# Patient Record
Sex: MALE | Race: BLACK OR AFRICAN AMERICAN | NOT HISPANIC OR LATINO | Employment: OTHER
[De-identification: names, ages, dates, MRNs, and addresses within clinical notes are randomized per-mention and may not be internally consistent; named-entity substitution may affect disease eponyms.]

---

## 2022-11-25 ENCOUNTER — HOSPITAL ENCOUNTER (OUTPATIENT)
Dept: DATA CONVERSION | Age: 82
End: 2022-11-25

## 2023-04-03 PROBLEM — R63.4 ABNORMAL WEIGHT LOSS: Status: ACTIVE | Noted: 2023-04-03

## 2023-04-03 PROBLEM — I25.2 PAST MYOCARDIAL INFARCTION: Status: ACTIVE | Noted: 2023-04-03

## 2023-04-03 PROBLEM — N40.0 BPH (BENIGN PROSTATIC HYPERPLASIA): Status: ACTIVE | Noted: 2023-04-03

## 2023-04-03 PROBLEM — J18.9 PNEUMONIA: Status: ACTIVE | Noted: 2023-04-03

## 2023-04-03 PROBLEM — E87.6 HYPOKALEMIA: Status: ACTIVE | Noted: 2023-04-03

## 2023-04-03 PROBLEM — H92.09 EAR PAIN: Status: ACTIVE | Noted: 2023-04-03

## 2023-04-03 PROBLEM — R39.15 URGENCY OF URINATION: Status: ACTIVE | Noted: 2023-04-03

## 2023-04-03 PROBLEM — R10.9 STOMACH DISCOMFORT: Status: ACTIVE | Noted: 2023-04-03

## 2023-04-03 PROBLEM — R60.9 EDEMA: Status: ACTIVE | Noted: 2023-04-03

## 2023-04-03 PROBLEM — H93.19 TINNITUS: Status: ACTIVE | Noted: 2023-04-03

## 2023-04-03 PROBLEM — K44.9 HIATAL HERNIA: Status: ACTIVE | Noted: 2023-04-03

## 2023-04-03 PROBLEM — I73.9 PERIPHERAL ARTERIAL DISEASE (CMS-HCC): Status: ACTIVE | Noted: 2023-04-03
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
## 2023-04-03 PROBLEM — R10.9 LEFT FLANK PAIN: Status: ACTIVE | Noted: 2023-04-03

## 2023-04-03 PROBLEM — I50.20: Status: ACTIVE | Noted: 2023-04-03

## 2023-04-03 PROBLEM — M48.061 LUMBAR SPINAL STENOSIS: Status: ACTIVE | Noted: 2023-04-03

## 2023-04-03 PROBLEM — R14.0 POSTPRANDIAL ABDOMINAL BLOATING: Status: ACTIVE | Noted: 2023-04-03

## 2023-04-03 PROBLEM — K21.00 REFLUX ESOPHAGITIS: Status: ACTIVE | Noted: 2023-04-03

## 2023-04-03 PROBLEM — M25.422 EFFUSION OF LEFT OLECRANON BURSA: Status: ACTIVE | Noted: 2023-04-03

## 2023-04-03 PROBLEM — H57.89 REDNESS OR DISCHARGE OF EYE: Status: ACTIVE | Noted: 2023-04-03

## 2023-04-03 PROBLEM — Z95.1 S/P CABG (CORONARY ARTERY BYPASS GRAFT): Status: ACTIVE | Noted: 2023-04-03

## 2023-04-03 PROBLEM — R97.20 ABNORMAL PSA: Status: ACTIVE | Noted: 2023-04-03
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
## 2023-04-03 PROBLEM — D64.9 ANEMIA: Status: ACTIVE | Noted: 2023-04-03

## 2023-04-03 PROBLEM — I10 HYPERTENSION: Status: ACTIVE | Noted: 2023-04-03

## 2023-04-03 PROBLEM — R05.9 COUGH: Status: ACTIVE | Noted: 2023-04-03

## 2023-04-03 PROBLEM — R10.816 ABDOMINAL TENDERNESS, EPIGASTRIC: Status: ACTIVE | Noted: 2023-04-03

## 2023-04-03 PROBLEM — E78.5 HLD (HYPERLIPIDEMIA): Status: ACTIVE | Noted: 2023-04-03

## 2023-04-03 PROBLEM — M54.30 SCIATIC NERVE PAIN: Status: ACTIVE | Noted: 2023-04-03

## 2023-04-03 PROBLEM — R35.0 FREQUENT URINATION: Status: ACTIVE | Noted: 2023-04-03

## 2023-04-03 PROBLEM — A04.8 HELICOBACTER PYLORI (H. PYLORI) INFECTION: Status: ACTIVE | Noted: 2023-04-03

## 2023-04-03 PROBLEM — M10.9 GOUT: Status: ACTIVE | Noted: 2023-04-03

## 2023-04-03 PROBLEM — R35.1 NOCTURIA: Status: ACTIVE | Noted: 2023-04-03

## 2023-04-03 PROBLEM — R04.2 HEMOPTYSIS: Status: ACTIVE | Noted: 2023-04-03

## 2023-04-03 PROBLEM — I73.9 INTERMITTENT CLAUDICATION (CMS-HCC): Status: ACTIVE | Noted: 2023-04-03

## 2023-04-03 PROBLEM — E11.40 NEUROPATHY IN DIABETES (MULTI): Status: ACTIVE | Noted: 2023-04-03

## 2023-04-03 PROBLEM — Z86.718 HISTORY OF DVT (DEEP VEIN THROMBOSIS): Status: ACTIVE | Noted: 2023-04-03

## 2023-04-03 PROBLEM — R10.9 RIGHT FLANK PAIN: Status: ACTIVE | Noted: 2023-04-03

## 2023-04-03 PROBLEM — N63.10 BREAST MASS, RIGHT: Status: ACTIVE | Noted: 2023-04-03

## 2023-04-03 PROBLEM — R06.09 DOE (DYSPNEA ON EXERTION): Status: ACTIVE | Noted: 2023-04-03

## 2023-04-03 PROBLEM — E11.9 DIABETES MELLITUS (MULTI): Status: ACTIVE | Noted: 2023-04-03

## 2023-04-03 PROBLEM — I50.20 HEART FAILURE WITH REDUCED EJECTION FRACTION (MULTI): Status: ACTIVE | Noted: 2023-04-03

## 2023-04-03 PROBLEM — R93.5 ABNORMAL CT OF THE ABDOMEN: Status: ACTIVE | Noted: 2023-04-03

## 2023-04-03 PROBLEM — G45.9 TIA (TRANSIENT ISCHEMIC ATTACK): Status: ACTIVE | Noted: 2023-04-03

## 2023-04-03 PROBLEM — I25.10 CAD (CORONARY ARTERY DISEASE): Status: ACTIVE | Noted: 2023-04-03

## 2023-04-03 PROBLEM — N31.9 NEUROGENIC BLADDER: Status: ACTIVE | Noted: 2023-04-03

## 2023-04-03 PROBLEM — I25.5 ISCHEMIC CARDIOMYOPATHY: Status: ACTIVE | Noted: 2023-04-03

## 2023-04-03 PROBLEM — H91.90 HEARING LOSS: Status: ACTIVE | Noted: 2023-04-03

## 2023-04-03 PROBLEM — Z95.810 ICD (IMPLANTABLE CARDIOVERTER-DEFIBRILLATOR) IN PLACE: Status: ACTIVE | Noted: 2023-04-03

## 2023-04-03 PROBLEM — R07.9 CHEST PAIN: Status: ACTIVE | Noted: 2023-04-03

## 2023-04-03 PROBLEM — M70.22 OLECRANON BURSITIS OF LEFT ELBOW: Status: ACTIVE | Noted: 2023-04-03

## 2023-04-03 PROBLEM — N28.9 RENAL INSUFFICIENCY: Status: ACTIVE | Noted: 2023-04-03

## 2023-04-03 PROBLEM — I34.0 MITRAL REGURGITATION: Status: ACTIVE | Noted: 2023-04-03

## 2023-04-03 PROBLEM — E03.9 HYPOTHYROIDISM: Status: ACTIVE | Noted: 2023-04-03

## 2023-04-03 PROBLEM — R11.2 NAUSEA AND VOMITING: Status: ACTIVE | Noted: 2023-04-03

## 2023-04-03 PROBLEM — R32 INCONTINENCE: Status: ACTIVE | Noted: 2023-04-03

## 2023-04-03 RX ORDER — FUROSEMIDE 40 MG/1
80 TABLET ORAL EVERY MORNING
COMMUNITY
Start: 2017-02-08 | End: 2023-11-16

## 2023-04-03 RX ORDER — ASPIRIN 81 MG/1
1 TABLET ORAL DAILY
COMMUNITY

## 2023-04-03 RX ORDER — OXYBUTYNIN CHLORIDE 5 MG/1
1 TABLET ORAL 2 TIMES DAILY
COMMUNITY
Start: 2022-07-22 | End: 2024-03-05 | Stop reason: ENTERED-IN-ERROR

## 2023-04-03 RX ORDER — ISOSORBIDE DINITRATE 40 MG/1
1 TABLET ORAL EVERY 8 HOURS
COMMUNITY
Start: 2018-05-10 | End: 2024-03-05 | Stop reason: ENTERED-IN-ERROR

## 2023-04-03 RX ORDER — TAMSULOSIN HYDROCHLORIDE 0.4 MG/1
0.4 CAPSULE ORAL DAILY
COMMUNITY
End: 2023-10-02 | Stop reason: SDUPTHER

## 2023-04-03 RX ORDER — CARVEDILOL 25 MG/1
2 TABLET ORAL 2 TIMES DAILY
COMMUNITY
Start: 2018-05-10 | End: 2024-06-03 | Stop reason: SDUPTHER

## 2023-04-03 RX ORDER — ATORVASTATIN CALCIUM 40 MG/1
1 TABLET, FILM COATED ORAL NIGHTLY
COMMUNITY
Start: 2018-10-23 | End: 2023-04-04 | Stop reason: SDUPTHER

## 2023-04-03 RX ORDER — HYDRALAZINE HYDROCHLORIDE 100 MG/1
1 TABLET, FILM COATED ORAL 3 TIMES DAILY
COMMUNITY
Start: 2018-04-21 | End: 2024-04-19 | Stop reason: SDUPTHER

## 2023-04-03 RX ORDER — RIVAROXABAN 10 MG/1
10 TABLET, FILM COATED ORAL
COMMUNITY
Start: 2022-04-08 | End: 2024-03-05 | Stop reason: ENTERED-IN-ERROR

## 2023-04-04 DIAGNOSIS — E78.5 HYPERLIPIDEMIA, UNSPECIFIED HYPERLIPIDEMIA TYPE: Primary | ICD-10-CM

## 2023-04-04 RX ORDER — ATORVASTATIN CALCIUM 40 MG/1
40 TABLET, FILM COATED ORAL NIGHTLY
Qty: 30 TABLET | Refills: 3 | Status: SHIPPED | OUTPATIENT
Start: 2023-04-04 | End: 2023-04-10 | Stop reason: SDUPTHER

## 2023-04-10 DIAGNOSIS — E78.5 HYPERLIPIDEMIA, UNSPECIFIED HYPERLIPIDEMIA TYPE: ICD-10-CM

## 2023-04-10 RX ORDER — ATORVASTATIN CALCIUM 40 MG/1
40 TABLET, FILM COATED ORAL NIGHTLY
Qty: 90 TABLET | Refills: 3 | Status: SHIPPED | OUTPATIENT
Start: 2023-04-10 | End: 2023-05-25 | Stop reason: SDUPTHER

## 2023-05-23 ENCOUNTER — TELEPHONE (OUTPATIENT)
Dept: PRIMARY CARE | Facility: CLINIC | Age: 83
End: 2023-05-23
Payer: COMMERCIAL

## 2023-05-25 DIAGNOSIS — E78.5 HYPERLIPIDEMIA, UNSPECIFIED HYPERLIPIDEMIA TYPE: ICD-10-CM

## 2023-05-25 RX ORDER — ATORVASTATIN CALCIUM 40 MG/1
40 TABLET, FILM COATED ORAL NIGHTLY
Qty: 90 TABLET | Refills: 3 | Status: SHIPPED | OUTPATIENT
Start: 2023-05-25 | End: 2024-05-31

## 2023-06-20 ENCOUNTER — OFFICE VISIT (OUTPATIENT)
Dept: PRIMARY CARE | Facility: CLINIC | Age: 83
End: 2023-06-20
Payer: COMMERCIAL

## 2023-06-20 VITALS — WEIGHT: 145.5 LBS | BODY MASS INDEX: 20.88 KG/M2

## 2023-06-20 DIAGNOSIS — H91.91 HEARING DEFICIT, RIGHT: ICD-10-CM

## 2023-06-20 DIAGNOSIS — I10 HYPERTENSION, UNSPECIFIED TYPE: Primary | ICD-10-CM

## 2023-06-20 DIAGNOSIS — F32.9 REACTIVE DEPRESSION: ICD-10-CM

## 2023-06-20 DIAGNOSIS — R53.83 FATIGUE, UNSPECIFIED TYPE: ICD-10-CM

## 2023-06-20 DIAGNOSIS — N39.41 URGE INCONTINENCE OF URINE: ICD-10-CM

## 2023-06-20 DIAGNOSIS — E78.5 HYPERLIPIDEMIA, UNSPECIFIED HYPERLIPIDEMIA TYPE: ICD-10-CM

## 2023-06-20 PROCEDURE — 85025 COMPLETE CBC W/AUTO DIFF WBC: CPT | Performed by: INTERNAL MEDICINE

## 2023-06-20 PROCEDURE — 80061 LIPID PANEL: CPT | Performed by: INTERNAL MEDICINE

## 2023-06-20 PROCEDURE — 80053 COMPREHEN METABOLIC PANEL: CPT | Performed by: INTERNAL MEDICINE

## 2023-06-20 PROCEDURE — 1159F MED LIST DOCD IN RCRD: CPT | Performed by: INTERNAL MEDICINE

## 2023-06-20 PROCEDURE — 84443 ASSAY THYROID STIM HORMONE: CPT | Performed by: INTERNAL MEDICINE

## 2023-06-20 PROCEDURE — 99214 OFFICE O/P EST MOD 30 MIN: CPT | Performed by: INTERNAL MEDICINE

## 2023-06-20 PROCEDURE — 1160F RVW MEDS BY RX/DR IN RCRD: CPT | Performed by: INTERNAL MEDICINE

## 2023-06-20 ASSESSMENT — ENCOUNTER SYMPTOMS
OCCASIONAL FEELINGS OF UNSTEADINESS: 0
LOSS OF SENSATION IN FEET: 1
DEPRESSION: 1

## 2023-06-20 NOTE — PROGRESS NOTES
Subjective   Patient ID: Chetan Luna is a 83 y.o. male who presents for Hearing Problem and Urinary Frequency.    HPI   83 years old male comes in to see me today complaining of urgency with frequent urination, depression concerning the loss of his wife and son, loss of hearing and his right ear.  He is experiencing minimal weight loss almost 5 to 6 pounds since earlier this year.  His appetite has diminished and he is not eating as much anymore.  He is staying with his daughter got 2 daughters in town.  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  He is asking for help.  Review of Systems  12 system reviewed all negative except for little mild weight loss and depression plus losing hearing in his right ear.  Objective   Wt 66 kg (145 lb 8 oz)   BMI 20.88 kg/m²     Physical Exam  Alert oriented pleasant as usual but depressed having tears at times.  Nonicteric sclera or jaundice.  Face symmetrical cranial nerves intact.  Neck supple no masses no lymph no thyromegaly or jugular venous distention.  Lungs clear diminished no rales or wheezing.  Heart normal S1 and S2 regular rhythm.  Abdomen benign neurological exam intact.  We agreed to consult ENT for his right ear, agreed to consult urology for his urge incontinence, also agreed to consult psychiatry for his depression.  His medications or remained the same.  No need for any refills come back when you are done with your this consultation and see what they tell you.  He said.  Assessment/Plan   Diagnoses and all orders for this visit:  Hypertension, unspecified type  -     Comprehensive Metabolic Panel  -     CBC  Hyperlipidemia, unspecified hyperlipidemia type  -     Lipid Panel  Fatigue, unspecified type  -     Thyroid Stimulating Hormone  Urge incontinence of urine  -     Referral to Urology; Future  Hearing deficit, right  -     Referral to ENT; Future  Reactive depression  -     Referral to  Psychiatry; Future

## 2023-07-08 ENCOUNTER — DOCUMENTATION (OUTPATIENT)
Dept: PRIMARY CARE | Facility: CLINIC | Age: 83
End: 2023-07-08
Payer: COMMERCIAL

## 2023-07-08 NOTE — PROGRESS NOTES
I called the patient and discussed his lab results with him please see neurology consultation by the end of this month and early August.  For urology and check his depression and his hearing loss etc. losing weight but he is trying to eat well and drink a lot of liquid.  He is feeling well he said.  Was very thankful for this call.

## 2023-10-02 ENCOUNTER — TELEPHONE (OUTPATIENT)
Dept: PRIMARY CARE | Facility: CLINIC | Age: 83
End: 2023-10-02
Payer: COMMERCIAL

## 2023-10-02 DIAGNOSIS — N40.1 BENIGN PROSTATIC HYPERPLASIA WITH LOWER URINARY TRACT SYMPTOMS, SYMPTOM DETAILS UNSPECIFIED: ICD-10-CM

## 2023-10-02 RX ORDER — TAMSULOSIN HYDROCHLORIDE 0.4 MG/1
0.4 CAPSULE ORAL DAILY
Qty: 0.9 CAPSULE | Refills: 3 | Status: SHIPPED | OUTPATIENT
Start: 2023-10-02 | End: 2024-10-01

## 2023-10-19 ENCOUNTER — OFFICE VISIT (OUTPATIENT)
Dept: PRIMARY CARE | Facility: CLINIC | Age: 83
End: 2023-10-19
Payer: COMMERCIAL

## 2023-11-08 ENCOUNTER — PHARMACY VISIT (OUTPATIENT)
Dept: PHARMACY | Facility: CLINIC | Age: 83
End: 2023-11-08
Payer: MEDICARE

## 2023-11-08 DIAGNOSIS — Z86.718 HISTORY OF DVT (DEEP VEIN THROMBOSIS): Primary | ICD-10-CM

## 2023-11-08 PROCEDURE — RXMED WILLOW AMBULATORY MEDICATION CHARGE

## 2023-11-16 DIAGNOSIS — I25.110 CORONARY ARTERY DISEASE INVOLVING NATIVE CORONARY ARTERY OF NATIVE HEART WITH UNSTABLE ANGINA PECTORIS (MULTI): Primary | ICD-10-CM

## 2023-11-16 RX ORDER — ISOSORBIDE DINITRATE 20 MG/1
20 TABLET ORAL EVERY 8 HOURS
Qty: 270 TABLET | Refills: 3 | Status: SHIPPED | OUTPATIENT
Start: 2023-11-16

## 2024-01-05 ENCOUNTER — HOSPITAL ENCOUNTER (EMERGENCY)
Facility: HOSPITAL | Age: 84
Discharge: HOME | End: 2024-01-05
Payer: COMMERCIAL

## 2024-01-05 ENCOUNTER — APPOINTMENT (OUTPATIENT)
Dept: RADIOLOGY | Facility: HOSPITAL | Age: 84
End: 2024-01-05
Payer: COMMERCIAL

## 2024-01-05 VITALS
WEIGHT: 160 LBS | HEART RATE: 86 BPM | RESPIRATION RATE: 18 BRPM | DIASTOLIC BLOOD PRESSURE: 68 MMHG | SYSTOLIC BLOOD PRESSURE: 120 MMHG | OXYGEN SATURATION: 99 % | TEMPERATURE: 98.8 F | HEIGHT: 70 IN | BODY MASS INDEX: 22.9 KG/M2

## 2024-01-05 DIAGNOSIS — T56.0X1A ACUTE LEAD-INDUCED GOUT INVOLVING TOE OF LEFT FOOT, INITIAL ENCOUNTER: Primary | ICD-10-CM

## 2024-01-05 DIAGNOSIS — M10.172 ACUTE LEAD-INDUCED GOUT INVOLVING TOE OF LEFT FOOT, INITIAL ENCOUNTER: Primary | ICD-10-CM

## 2024-01-05 PROCEDURE — 99284 EMERGENCY DEPT VISIT MOD MDM: CPT

## 2024-01-05 PROCEDURE — 73610 X-RAY EXAM OF ANKLE: CPT | Mod: LEFT SIDE | Performed by: RADIOLOGY

## 2024-01-05 PROCEDURE — 73630 X-RAY EXAM OF FOOT: CPT | Mod: LEFT SIDE | Performed by: RADIOLOGY

## 2024-01-05 PROCEDURE — 2500000004 HC RX 250 GENERAL PHARMACY W/ HCPCS (ALT 636 FOR OP/ED)

## 2024-01-05 PROCEDURE — 73630 X-RAY EXAM OF FOOT: CPT | Mod: LT

## 2024-01-05 PROCEDURE — 73610 X-RAY EXAM OF ANKLE: CPT | Mod: LT,FR

## 2024-01-05 RX ORDER — ACETAMINOPHEN 325 MG/1
TABLET ORAL
Status: COMPLETED
Start: 2024-01-05 | End: 2024-01-05

## 2024-01-05 RX ORDER — ACETAMINOPHEN 325 MG/1
975 TABLET ORAL ONCE
Status: COMPLETED | OUTPATIENT
Start: 2024-01-05 | End: 2024-01-05

## 2024-01-05 RX ORDER — PREDNISONE 20 MG/1
TABLET ORAL
Status: COMPLETED
Start: 2024-01-05 | End: 2024-01-05

## 2024-01-05 RX ORDER — PREDNISONE 20 MG/1
40 TABLET ORAL DAILY
Qty: 8 TABLET | Refills: 0 | Status: SHIPPED | OUTPATIENT
Start: 2024-01-05 | End: 2024-01-09

## 2024-01-05 RX ORDER — PREDNISONE 20 MG/1
60 TABLET ORAL ONCE
Status: COMPLETED | OUTPATIENT
Start: 2024-01-05 | End: 2024-01-05

## 2024-01-05 RX ORDER — ACETAMINOPHEN 500 MG
1000 TABLET ORAL EVERY 6 HOURS PRN
Qty: 30 TABLET | Refills: 0 | Status: SHIPPED | OUTPATIENT
Start: 2024-01-05 | End: 2024-01-15

## 2024-01-05 RX ADMIN — ACETAMINOPHEN 975 MG: 325 TABLET ORAL at 14:04

## 2024-01-05 RX ADMIN — PREDNISONE 60 MG: 20 TABLET ORAL at 14:05

## 2024-01-05 ASSESSMENT — COLUMBIA-SUICIDE SEVERITY RATING SCALE - C-SSRS
2. HAVE YOU ACTUALLY HAD ANY THOUGHTS OF KILLING YOURSELF?: NO
1. IN THE PAST MONTH, HAVE YOU WISHED YOU WERE DEAD OR WISHED YOU COULD GO TO SLEEP AND NOT WAKE UP?: NO
6. HAVE YOU EVER DONE ANYTHING, STARTED TO DO ANYTHING, OR PREPARED TO DO ANYTHING TO END YOUR LIFE?: NO

## 2024-01-05 ASSESSMENT — PAIN SCALES - GENERAL: PAINLEVEL_OUTOF10: 9

## 2024-01-05 ASSESSMENT — PAIN DESCRIPTION - PAIN TYPE: TYPE: ACUTE PAIN

## 2024-01-05 ASSESSMENT — PAIN - FUNCTIONAL ASSESSMENT: PAIN_FUNCTIONAL_ASSESSMENT: 0-10

## 2024-01-05 NOTE — ED PROVIDER NOTES
HPI   Chief Complaint   Patient presents with    Leg Swelling     Left foot       This an 83-year-old male coming in for left ankle and foot pain.  Patient reports that the pain has been present for the last few days.  He denies any injury.  He has been told that he has a history of gout.  Patient states that the pain increases when the area is palpated.  He also reports increased pain on ambulation.  No other areas of pain or discomfort.                          No data recorded                Patient History   Past Medical History:   Diagnosis Date    Encounter for screening for malignant neoplasm of prostate     Screening PSA (prostate specific antigen)    Old myocardial infarction 09/14/2017    History of myocardial infarction    Personal history of other diseases of the musculoskeletal system and connective tissue     Personal history of arthritis     Past Surgical History:   Procedure Laterality Date    CARDIAC PACEMAKER PLACEMENT  02/14/2014    Pacemaker Placement    CHOLECYSTECTOMY  10/19/2016    Cholecystectomy    CORONARY ARTERY BYPASS GRAFT  10/19/2016    CABG    CT ANGIO NECK  10/22/2018    CT NECK ANGIO W AND WO IV CONTRAST 10/22/2018 [facility] CLINICAL LEGACY    CT HEAD ANGIO W AND WO IV CONTRAST  10/22/2018    CT HEAD ANGIO W AND WO IV CONTRAST 10/22/2018 [facility] CLINICAL LEGACY    OTHER SURGICAL HISTORY  02/07/2014    Coronary Artery Double Venous Bypass Graft    OTHER SURGICAL HISTORY  02/07/2014    Cholecystotomy     Family History   Problem Relation Name Age of Onset    Heart failure Mother      Prostate cancer Father       Social History     Tobacco Use    Smoking status: Some Days     Types: Cigars    Smokeless tobacco: Never   Substance Use Topics    Alcohol use: Not on file    Drug use: Not on file       Physical Exam   ED Triage Vitals [01/05/24 1330]   Temp Heart Rate Resp BP   37.1 °C (98.8 °F) 86 18 120/68      SpO2 Temp Source Heart Rate Source Patient Position   99 % Temporal Monitor --      BP  Location FiO2 (%)     Left arm --       Physical Exam  Vitals and nursing note reviewed.   Constitutional:       General: He is not in acute distress.     Appearance: Normal appearance. He is not ill-appearing or toxic-appearing.   HENT:      Head: Normocephalic and atraumatic.   Eyes:      Extraocular Movements: Extraocular movements intact.      Conjunctiva/sclera: Conjunctivae normal.      Pupils: Pupils are equal, round, and reactive to light.   Cardiovascular:      Rate and Rhythm: Regular rhythm.      Pulses: Normal pulses.      Heart sounds: Normal heart sounds.   Pulmonary:      Effort: Pulmonary effort is normal. No respiratory distress.      Breath sounds: Normal breath sounds.   Abdominal:      General: Abdomen is flat. There is no distension.   Musculoskeletal:         General: Normal range of motion.      Cervical back: Normal range of motion and neck supple.   Feet:      Comments: Patient reports pain on palpation to the first MTP joint.  He also reports pain in the ankle however no pain to palpation to this area.  Skin:     General: Skin is warm and dry.   Neurological:      General: No focal deficit present.      Mental Status: He is alert and oriented to person, place, and time.   Psychiatric:         Mood and Affect: Mood normal.         Behavior: Behavior normal.         Thought Content: Thought content normal.         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 Patient case including ROS, PE, and treatment and plan discussed with ED attending if attached as cosigner. Results from labs and or imaging included below if completed. Chetan Luna  is a 83 y.o. coming in for Patient presents with:  Leg Swelling: Left foot  .  Due to patient's complaint imaging was completed of the areas of discomfort.  Imaging shows no  acute concerning abnormalities.  He is made aware of the vascular calcifications.  He is given prednisone and Tylenol.  He states that this pain has been improving.  Patient will be discharged with Tylenol and prednisone.  He is a diabetic and advised increased blood sugar checks.  Follow-up with his PCP for further evaluation and care.    Patient was advised to follow up with PCP or recommended provider in 2-3 days for another evaluation and exam. I advised patient/guardian to return or go to closest emergency room immediately if symptoms change, get worse, new symptoms develop prior to follow up. If there is no improvement in symptoms in the next 24 hours they are advised to return for further evaluation and exam. I also explained the plan and treatment course. Patient/guardian is in agreement with plan, treatment course, and follow up and states verbally that they will comply.    Labs Reviewed - No data to display   XR foot left 3+ views   Final Result    Left Foot:  Demineralization.  Bony structures appear intact.     Atherosclerotic vascular calcifications.    Left Ankle:  Demineralization.  Bony structures appear intact.     Atherosclerotic vascular calcifications.    Signed by Kody Jolly MD     XR ankle left 3+ views   Final Result    Left Foot:  Demineralization.  Bony structures appear intact.     Atherosclerotic vascular calcifications.    Left Ankle:  Demineralization.  Bony structures appear intact.     Atherosclerotic vascular calcifications.    Signed by Kody Jolly MD          Tests/Medications/Escalations of Care considered but not given: As in MDM    Patient care discussed with: N/A  Social Determinants affecting care: N/A    Final diagnosis and disposition as documented       Homegoing. I discussed the differential; results and discharge plan with the patient and/or family/friend/caregiver if present.  I emphasized the importance of follow-up with the physician I referred them to in the  timeframe recommended.  I explained reasons for the patient to return to the Emergency Department. They agreed that if they feel their condition is worsening or if they have any other concern they should call 911 immediately for further assistance. I gave the patient an opportunity to ask all questions they had and answered all of them accordingly. They understand return precautions and discharge instructions. The patient and/or family/friend/caregiver expressed understanding verbally and that they would comply.     Disposition: Discharge      This note has been transcribed using voice recognition and may contain grammatical errors, misplaced words, incorrect words, incorrect phrases or other errors.         Procedure  Procedures     Angel Denney PA-C  01/05/24 1553

## 2024-01-15 DIAGNOSIS — Z95.810 CARDIAC DEFIBRILLATOR IN PLACE: Primary | ICD-10-CM

## 2024-01-15 DIAGNOSIS — I42.9 CARDIOMYOPATHY, UNSPECIFIED TYPE (MULTI): ICD-10-CM

## 2024-02-07 ENCOUNTER — APPOINTMENT (OUTPATIENT)
Dept: CARDIOLOGY | Facility: CLINIC | Age: 84
End: 2024-02-07
Payer: COMMERCIAL

## 2024-02-14 DIAGNOSIS — I25.5 ISCHEMIC CARDIOMYOPATHY: Primary | ICD-10-CM

## 2024-03-05 ENCOUNTER — APPOINTMENT (OUTPATIENT)
Dept: CARDIOLOGY | Facility: HOSPITAL | Age: 84
End: 2024-03-05
Payer: COMMERCIAL

## 2024-03-05 ENCOUNTER — APPOINTMENT (OUTPATIENT)
Dept: RADIOLOGY | Facility: HOSPITAL | Age: 84
End: 2024-03-05
Payer: COMMERCIAL

## 2024-03-05 ENCOUNTER — HOSPITAL ENCOUNTER (EMERGENCY)
Facility: HOSPITAL | Age: 84
Discharge: HOME | End: 2024-03-05
Attending: EMERGENCY MEDICINE
Payer: COMMERCIAL

## 2024-03-05 VITALS
WEIGHT: 152 LBS | DIASTOLIC BLOOD PRESSURE: 90 MMHG | HEIGHT: 70 IN | RESPIRATION RATE: 19 BRPM | BODY MASS INDEX: 21.76 KG/M2 | OXYGEN SATURATION: 97 % | SYSTOLIC BLOOD PRESSURE: 153 MMHG | HEART RATE: 71 BPM | TEMPERATURE: 98.4 F

## 2024-03-05 DIAGNOSIS — M25.511 ACUTE PAIN OF RIGHT SHOULDER: Primary | ICD-10-CM

## 2024-03-05 LAB
ALBUMIN SERPL BCP-MCNC: 4.2 G/DL (ref 3.4–5)
ALP SERPL-CCNC: 84 U/L (ref 33–136)
ALT SERPL W P-5'-P-CCNC: 8 U/L (ref 10–52)
ANION GAP SERPL CALC-SCNC: 14 MMOL/L (ref 10–20)
AST SERPL W P-5'-P-CCNC: 13 U/L (ref 9–39)
ATRIAL RATE: 76 BPM
BASOPHILS # BLD AUTO: 0.02 X10*3/UL (ref 0–0.1)
BASOPHILS NFR BLD AUTO: 0.3 %
BILIRUB SERPL-MCNC: 0.5 MG/DL (ref 0–1.2)
BUN SERPL-MCNC: 20 MG/DL (ref 6–23)
CALCIUM SERPL-MCNC: 8.8 MG/DL (ref 8.6–10.3)
CARDIAC TROPONIN I PNL SERPL HS: 25 NG/L (ref 0–20)
CARDIAC TROPONIN I PNL SERPL HS: 26 NG/L (ref 0–20)
CHLORIDE SERPL-SCNC: 105 MMOL/L (ref 98–107)
CO2 SERPL-SCNC: 21 MMOL/L (ref 21–32)
CREAT SERPL-MCNC: 1.63 MG/DL (ref 0.5–1.3)
CRP SERPL-MCNC: 1.06 MG/DL
EGFRCR SERPLBLD CKD-EPI 2021: 42 ML/MIN/1.73M*2
EOSINOPHIL # BLD AUTO: 0.06 X10*3/UL (ref 0–0.4)
EOSINOPHIL NFR BLD AUTO: 0.8 %
ERYTHROCYTE [DISTWIDTH] IN BLOOD BY AUTOMATED COUNT: 16.7 % (ref 11.5–14.5)
ERYTHROCYTE [SEDIMENTATION RATE] IN BLOOD BY WESTERGREN METHOD: 51 MM/H (ref 0–20)
GLUCOSE SERPL-MCNC: 147 MG/DL (ref 74–99)
HCT VFR BLD AUTO: 35.1 % (ref 41–52)
HGB BLD-MCNC: 11.8 G/DL (ref 13.5–17.5)
IMM GRANULOCYTES # BLD AUTO: 0.03 X10*3/UL (ref 0–0.5)
IMM GRANULOCYTES NFR BLD AUTO: 0.4 % (ref 0–0.9)
LYMPHOCYTES # BLD AUTO: 0.82 X10*3/UL (ref 0.8–3)
LYMPHOCYTES NFR BLD AUTO: 11.2 %
MCH RBC QN AUTO: 26.8 PG (ref 26–34)
MCHC RBC AUTO-ENTMCNC: 33.6 G/DL (ref 32–36)
MCV RBC AUTO: 80 FL (ref 80–100)
MONOCYTES # BLD AUTO: 0.83 X10*3/UL (ref 0.05–0.8)
MONOCYTES NFR BLD AUTO: 11.3 %
NEUTROPHILS # BLD AUTO: 5.59 X10*3/UL (ref 1.6–5.5)
NEUTROPHILS NFR BLD AUTO: 76 %
NRBC BLD-RTO: 0 /100 WBCS (ref 0–0)
P AXIS: 111 DEGREES
P OFFSET: 176 MS
P ONSET: 147 MS
PLATELET # BLD AUTO: 208 X10*3/UL (ref 150–450)
POTASSIUM SERPL-SCNC: 3.9 MMOL/L (ref 3.5–5.3)
PR INTERVAL: 170 MS
PROT SERPL-MCNC: 7.2 G/DL (ref 6.4–8.2)
Q ONSET: 196 MS
QRS COUNT: 13 BEATS
QRS DURATION: 174 MS
QT INTERVAL: 454 MS
QTC CALCULATION(BAZETT): 510 MS
QTC FREDERICIA: 491 MS
R AXIS: 199 DEGREES
RBC # BLD AUTO: 4.41 X10*6/UL (ref 4.5–5.9)
SODIUM SERPL-SCNC: 136 MMOL/L (ref 136–145)
T AXIS: 23 DEGREES
T OFFSET: 423 MS
VENTRICULAR RATE: 76 BPM
WBC # BLD AUTO: 7.4 X10*3/UL (ref 4.4–11.3)

## 2024-03-05 PROCEDURE — 86140 C-REACTIVE PROTEIN: CPT | Performed by: EMERGENCY MEDICINE

## 2024-03-05 PROCEDURE — 2500000001 HC RX 250 WO HCPCS SELF ADMINISTERED DRUGS (ALT 637 FOR MEDICARE OP): Performed by: EMERGENCY MEDICINE

## 2024-03-05 PROCEDURE — 93005 ELECTROCARDIOGRAM TRACING: CPT

## 2024-03-05 PROCEDURE — 84484 ASSAY OF TROPONIN QUANT: CPT | Performed by: EMERGENCY MEDICINE

## 2024-03-05 PROCEDURE — 85025 COMPLETE CBC W/AUTO DIFF WBC: CPT | Performed by: PHYSICIAN ASSISTANT

## 2024-03-05 PROCEDURE — 84484 ASSAY OF TROPONIN QUANT: CPT | Performed by: PHYSICIAN ASSISTANT

## 2024-03-05 PROCEDURE — 73200 CT UPPER EXTREMITY W/O DYE: CPT | Mod: RIGHT SIDE | Performed by: RADIOLOGY

## 2024-03-05 PROCEDURE — 96374 THER/PROPH/DIAG INJ IV PUSH: CPT

## 2024-03-05 PROCEDURE — 36415 COLL VENOUS BLD VENIPUNCTURE: CPT | Performed by: EMERGENCY MEDICINE

## 2024-03-05 PROCEDURE — 96375 TX/PRO/DX INJ NEW DRUG ADDON: CPT

## 2024-03-05 PROCEDURE — 80053 COMPREHEN METABOLIC PANEL: CPT | Performed by: PHYSICIAN ASSISTANT

## 2024-03-05 PROCEDURE — 99285 EMERGENCY DEPT VISIT HI MDM: CPT | Mod: 25

## 2024-03-05 PROCEDURE — 36415 COLL VENOUS BLD VENIPUNCTURE: CPT | Performed by: PHYSICIAN ASSISTANT

## 2024-03-05 PROCEDURE — 73030 X-RAY EXAM OF SHOULDER: CPT | Mod: RIGHT SIDE | Performed by: RADIOLOGY

## 2024-03-05 PROCEDURE — 2500000004 HC RX 250 GENERAL PHARMACY W/ HCPCS (ALT 636 FOR OP/ED): Performed by: PHYSICIAN ASSISTANT

## 2024-03-05 PROCEDURE — 73030 X-RAY EXAM OF SHOULDER: CPT | Mod: RT

## 2024-03-05 PROCEDURE — 73200 CT UPPER EXTREMITY W/O DYE: CPT | Mod: RT

## 2024-03-05 PROCEDURE — 2500000004 HC RX 250 GENERAL PHARMACY W/ HCPCS (ALT 636 FOR OP/ED): Performed by: EMERGENCY MEDICINE

## 2024-03-05 PROCEDURE — 85652 RBC SED RATE AUTOMATED: CPT | Performed by: PHYSICIAN ASSISTANT

## 2024-03-05 RX ORDER — KETOROLAC TROMETHAMINE 30 MG/ML
15 INJECTION, SOLUTION INTRAMUSCULAR; INTRAVENOUS ONCE
Status: COMPLETED | OUTPATIENT
Start: 2024-03-05 | End: 2024-03-05

## 2024-03-05 RX ORDER — ACETAMINOPHEN 500 MG
1000 TABLET ORAL EVERY 8 HOURS PRN
Qty: 42 TABLET | Refills: 0 | Status: SHIPPED | OUTPATIENT
Start: 2024-03-05 | End: 2024-03-12

## 2024-03-05 RX ORDER — OXYCODONE HYDROCHLORIDE 5 MG/1
5 TABLET ORAL ONCE
Status: COMPLETED | OUTPATIENT
Start: 2024-03-05 | End: 2024-03-05

## 2024-03-05 RX ORDER — MORPHINE SULFATE 4 MG/ML
4 INJECTION, SOLUTION INTRAMUSCULAR; INTRAVENOUS ONCE
Status: COMPLETED | OUTPATIENT
Start: 2024-03-05 | End: 2024-03-05

## 2024-03-05 RX ORDER — OXYCODONE HYDROCHLORIDE 5 MG/1
5 TABLET ORAL EVERY 8 HOURS PRN
Qty: 9 TABLET | Refills: 0 | Status: SHIPPED | OUTPATIENT
Start: 2024-03-05 | End: 2024-03-08

## 2024-03-05 RX ADMIN — MORPHINE SULFATE 4 MG: 4 INJECTION, SOLUTION INTRAMUSCULAR; INTRAVENOUS at 04:30

## 2024-03-05 RX ADMIN — OXYCODONE HYDROCHLORIDE 5 MG: 5 TABLET ORAL at 08:11

## 2024-03-05 RX ADMIN — KETOROLAC TROMETHAMINE 15 MG: 30 INJECTION, SOLUTION INTRAMUSCULAR at 08:11

## 2024-03-05 ASSESSMENT — PAIN DESCRIPTION - DESCRIPTORS: DESCRIPTORS: SHARP

## 2024-03-05 ASSESSMENT — COLUMBIA-SUICIDE SEVERITY RATING SCALE - C-SSRS
1. IN THE PAST MONTH, HAVE YOU WISHED YOU WERE DEAD OR WISHED YOU COULD GO TO SLEEP AND NOT WAKE UP?: NO
6. HAVE YOU EVER DONE ANYTHING, STARTED TO DO ANYTHING, OR PREPARED TO DO ANYTHING TO END YOUR LIFE?: NO
2. HAVE YOU ACTUALLY HAD ANY THOUGHTS OF KILLING YOURSELF?: NO

## 2024-03-05 ASSESSMENT — PAIN DESCRIPTION - LOCATION
LOCATION: SHOULDER
LOCATION: ARM
LOCATION: SHOULDER

## 2024-03-05 ASSESSMENT — PAIN DESCRIPTION - ONSET: ONSET: GRADUAL

## 2024-03-05 ASSESSMENT — PAIN SCALES - GENERAL
PAINLEVEL_OUTOF10: 10 - WORST POSSIBLE PAIN
PAINLEVEL_OUTOF10: 9

## 2024-03-05 ASSESSMENT — PAIN - FUNCTIONAL ASSESSMENT
PAIN_FUNCTIONAL_ASSESSMENT: 0-10

## 2024-03-05 ASSESSMENT — PAIN DESCRIPTION - PROGRESSION: CLINICAL_PROGRESSION: NOT CHANGED

## 2024-03-05 ASSESSMENT — PAIN DESCRIPTION - ORIENTATION
ORIENTATION: RIGHT
ORIENTATION: RIGHT

## 2024-03-05 ASSESSMENT — PAIN DESCRIPTION - PAIN TYPE: TYPE: ACUTE PAIN

## 2024-03-05 ASSESSMENT — PAIN DESCRIPTION - DIRECTION: RADIATING_TOWARDS: NECK

## 2024-03-05 ASSESSMENT — PAIN DESCRIPTION - FREQUENCY: FREQUENCY: CONSTANT/CONTINUOUS

## 2024-03-05 NOTE — DISCHARGE INSTRUCTIONS
Referral placed for follow-up with orthopedic surgery for right shoulder pain.  Return to emergency department if new or worsening symptoms especially chest pain, shortness of breath, fever, weakness or numbness, severe uncontrolled pain.

## 2024-03-05 NOTE — ED PROVIDER NOTES
"Chief Complaint   Patient presents with    Arm Pain    Neck Pain     HPI:   Chetan Luna is an 84 y.o. male with complicated PMH including CVA with residual left-sided weakness, HTN, HLD, BPH, gout, T2DM with neuropathy, HFrEF (echo 04/2022 EF 16%), CAD (MI s/p CABG), PAD, s/p AICD who presents to the ED for evaluation of left shoulder pain.  Patient says pain began 2 days ago when he woke up from sleep.  It radiates into the base of the right side of his neck and down his right arm.  He describes it as an intense throbbing.  Rates pain 10/10.  He took 1500 mg of Tylenol about 6 hours ago and it has not helped.  He denies any numbness or tingling.  Pain is worse with movement and worse when he lies flat and tries to sleep.  He is left-hand dominant but has been using his right hand more ever since his stroke.  He denies any chest pain, shortness of breath, palpitations, extremity weakness, dizziness or lightheadedness, abdominal pain, dysuria.  No falls or injuries.    Medications: \"I cannot remember the mall but they are my chart\"  Soc HX: Lives at home with daughter, granddaughter and great granddaughter  Allergies   Allergen Reactions    Penicillin Anaphylaxis    Penicillin G Other     Syncope    Streptomycin Other     Syncope   :  Past Medical History:   Diagnosis Date    Encounter for screening for malignant neoplasm of prostate     Screening PSA (prostate specific antigen)    Old myocardial infarction 09/14/2017    History of myocardial infarction    Personal history of other diseases of the musculoskeletal system and connective tissue     Personal history of arthritis     Past Surgical History:   Procedure Laterality Date    CARDIAC PACEMAKER PLACEMENT  02/14/2014    Pacemaker Placement    CHOLECYSTECTOMY  10/19/2016    Cholecystectomy    CORONARY ARTERY BYPASS GRAFT  10/19/2016    CABG    CT ANGIO NECK  10/22/2018    CT NECK ANGIO W AND WO IV CONTRAST 10/22/2018 [facility] CLINICAL LEGACY    CT HEAD ANGIO W AND " WO IV CONTRAST  10/22/2018    CT HEAD ANGIO W AND WO IV CONTRAST 10/22/2018 [facility] CLINICAL LEGACY    OTHER SURGICAL HISTORY  02/07/2014    Coronary Artery Double Venous Bypass Graft    OTHER SURGICAL HISTORY  02/07/2014    Cholecystotomy     Family History   Problem Relation Name Age of Onset    Heart failure Mother      Prostate cancer Father        Physical Exam  Vitals and nursing note reviewed.   Constitutional:       General: He is not in acute distress.     Appearance: Normal appearance. He is not ill-appearing or toxic-appearing.   Eyes:      Pupils: Pupils are equal, round, and reactive to light.   Neck:      Comments: Range of motion of neck is normal but when he turns neck to the left sides does report that pain is worse  Cardiovascular:      Rate and Rhythm: Normal rate and regular rhythm.      Pulses: Normal pulses.      Heart sounds: Murmur heard.   Pulmonary:      Effort: Pulmonary effort is normal. No respiratory distress.      Breath sounds: Normal breath sounds.   Abdominal:      General: Bowel sounds are normal.      Palpations: Abdomen is soft.      Tenderness: There is no abdominal tenderness. There is no guarding or rebound.   Musculoskeletal:      Cervical back: Normal range of motion. No tenderness.      Comments: RUE: Tenderness with palpation of the right shoulder down to right elbow.  Normal range of motion right wrist.  Limited range of motion right elbow.  Will not lower assessment of range of motion of right shoulder.  No midline spinal tenderness.  No step-off.  No tenderness with palpation over the clavicle.  No obvious deformity although patient's right shoulder does appear slightly more pronounced than left shoulder although this may be due to the way that he is sitting.   Skin:     General: Skin is warm and dry.      Capillary Refill: Capillary refill takes less than 2 seconds.      Findings: No bruising or rash.   Neurological:      General: No focal deficit present.      Mental  Status: He is alert.      Cranial Nerves: No cranial nerve deficit.     VS: As documented in the triage note and EMR flowsheet from this visit were reviewed.    External Records Reviewed: I reviewed recent and relevant outside records including: Reviewed stress test 4/18/2022 which notes severe perfusion defect involving basal through distal anterior/anteroseptal territory extending into apex.  Severe left ventricular dilation.  EF 16%    EKG INTERPRETATION:      Personally Reviewed      Rhythm: V2 placed rhythm with PVCs      Rate:   76 bpm     Axis: LAD      Intervals:  Normal WV interval 170 ms     QRS Complex:  Normal        QT Interval: QTc prolonged 510 ms          Medical Decision Making:   ED Course as of 03/05/24 0635   Tue Mar 05, 2024   0325 Vitals Reviewed: Afebrile. Hypertensive. Not tachycardic nor tachypneic. No hypoxia.   [KA]   0401 Patient is 84-year-old male who presents to the ED for evaluation of right shoulder pain, atraumatic.  He was complaining of neck pain however pain radiates into the neck it is not coming from the neck.  He has normal range of motion of the neck with no midline spinal tenderness.  Range of motion turning neck to the left does cause pain in the right shoulder.  He has tenderness with palpation of the right shoulder and upper arm with normal  strength.  Limited range of motion right elbow and will not allow assessment of range of motion of right shoulder.  Suspect musculoskeletal etiology although given his significant cardiac history will obtain cardiac workup to include troponin.  Will also obtain ESR and right shoulder x-ray.  Had considered CT angio head and neck to evaluate for cervical etiology of pain however he is no neurodeficits, no tenderness to palpation, no real neck pain and I do not feel that this is necessary at this time.  Patient to be on 4 mg IV morphine. [KA]   0515 Radiology reads x-ray as mild degenerative changes without any acute findings. [KA]    0616 I personally viewed labs.  CBC without leukocytosis.  Normocytic anemia with hemoglobin 11.8.  ESR elevated.  Troponin 26 although this may be sequela of CKD.  Creatinine 1.63 which is similar to baseline. [KA]   0635 Patient signed out to attending pending delta troponin and reevaluation. [KA]      ED Course User Index  [KA] Grazyna Kohler PA-C      Chronic Medical Conditions Significantly Affecting Care: Age   Discussion of Management with Other Providers:  I discussed the patient/results with: Attending Cover    Counseling: Spoke with the patient and discussed today´s findings, in addition to providing specific details for the plan of care and expected course.  Patient was given the opportunity to ask questions.  Educated on the common potential side effects of medications prescribed.    The plan of care was mutually agreed upon with the patient. The patient and/or family were given the opportunity to ask questions. All questions asked today in the ED were answered to the best of my ability with today's information.    This patient was cared for in the setting of nationwide stress on resources and staffing.    This report was transcribed using voice recognition software.  Every effort was made to ensure accuracy, however, inadvertently computerized transcription errors may be present.       Grazyna Kohler PA-C  03/05/24 0649

## 2024-03-05 NOTE — ED TRIAGE NOTES
Pt stated that his right neck and arm stated to hurt 2 days ago and now its getting worse. Pt stated that he took 3 tylenol 500 mg 3 hrs ago. Nothing he has done has improved the pain. No injury or deformity to arm.

## 2024-03-06 ENCOUNTER — APPOINTMENT (OUTPATIENT)
Dept: ORTHOPEDIC SURGERY | Facility: HOSPITAL | Age: 84
End: 2024-03-06
Payer: COMMERCIAL

## 2024-03-08 ENCOUNTER — OFFICE VISIT (OUTPATIENT)
Dept: ORTHOPEDIC SURGERY | Facility: HOSPITAL | Age: 84
End: 2024-03-08
Payer: COMMERCIAL

## 2024-03-08 DIAGNOSIS — M25.511 ACUTE PAIN OF RIGHT SHOULDER: Primary | ICD-10-CM

## 2024-03-08 PROCEDURE — 1159F MED LIST DOCD IN RCRD: CPT | Performed by: ORTHOPAEDIC SURGERY

## 2024-03-08 PROCEDURE — 99213 OFFICE O/P EST LOW 20 MIN: CPT | Mod: 25 | Performed by: ORTHOPAEDIC SURGERY

## 2024-03-08 PROCEDURE — 20610 DRAIN/INJ JOINT/BURSA W/O US: CPT | Performed by: ORTHOPAEDIC SURGERY

## 2024-03-08 PROCEDURE — 1126F AMNT PAIN NOTED NONE PRSNT: CPT | Performed by: ORTHOPAEDIC SURGERY

## 2024-03-08 PROCEDURE — 2500000005 HC RX 250 GENERAL PHARMACY W/O HCPCS: Performed by: ORTHOPAEDIC SURGERY

## 2024-03-08 PROCEDURE — 2500000004 HC RX 250 GENERAL PHARMACY W/ HCPCS (ALT 636 FOR OP/ED): Performed by: ORTHOPAEDIC SURGERY

## 2024-03-08 PROCEDURE — 99203 OFFICE O/P NEW LOW 30 MIN: CPT | Performed by: ORTHOPAEDIC SURGERY

## 2024-03-08 RX ORDER — TRIAMCINOLONE ACETONIDE 40 MG/ML
40 INJECTION, SUSPENSION INTRA-ARTICULAR; INTRAMUSCULAR
Status: COMPLETED | OUTPATIENT
Start: 2024-03-08 | End: 2024-03-08

## 2024-03-08 RX ORDER — LIDOCAINE HYDROCHLORIDE 10 MG/ML
4 INJECTION INFILTRATION; PERINEURAL
Status: COMPLETED | OUTPATIENT
Start: 2024-03-08 | End: 2024-03-08

## 2024-03-08 RX ADMIN — LIDOCAINE HYDROCHLORIDE 4 ML: 10 INJECTION, SOLUTION INFILTRATION; PERINEURAL at 09:23

## 2024-03-08 RX ADMIN — TRIAMCINOLONE ACETONIDE 40 MG: 400 INJECTION, SUSPENSION INTRA-ARTICULAR; INTRAMUSCULAR at 09:23

## 2024-03-08 NOTE — PROGRESS NOTES
Patient here evaluation of his right shoulder he has right shoulder subacute onset of pain over the deltoid and up into the right side of his neck.  He also has a stiff neck difficulty turning to the right.  He finds very difficult to elevate his arm without pain.  Is disruptive of his sleep no paresthesias.  No left shoulder symptoms.    The patient is pleasant and cooperative.  The patient is alert and oriented ×3.  Auditory function is intact.  The patient is a good historian.  The patient is not in acute distress.  Eye exam significant for nonicteric sclera, intact ocular muscle movement.  Breathing is rhythmic symmetric and nonlabored.  Patient slightly decreased rotation cervical spine to the right side which does reproduce pain.  He also has difficulty elevating his arm passively can be elevated to 90 degrees with pain external rotation 30 degrees with pain internal rotation to anterior iliac crest with pain.  Motor power abduction 4 external rotation 4 internal rotation 5.    Radiographs demonstrate acromioclavicular arthritic degenerative changes subacromial glenohumeral space is well-preserved no obvious arthritic degenerative changes of glenohumeral joint.    Patient also had a CT scan which demonstrates intact glenohumeral joint.    Right shoulder pain the patient has right shoulder pain attributable to bursitis or rotator cuff problem.  We discussed options for treatment and I recommended injections.  This treatment step.  Injection was performed and tolerated well.  Patient has been asked to call and report the results a week and a half.  If he does not get relief I recommend an MRI of the right shoulder.  This was dictated using voice recognition software and not corrected for grammatical or spelling errors.            L Inj/Asp: R subacromial bursa on 3/8/2024 9:23 AM  Indications: pain  Details: 22 G needle, posterior approach  Medications: 40 mg triamcinolone acetonide 40 mg/mL; 4 mL lidocaine 10  mg/mL (1 %)  Procedure, treatment alternatives, risks and benefits explained, specific risks discussed. Consent was given by the patient.

## 2024-03-18 ENCOUNTER — PHARMACY VISIT (OUTPATIENT)
Dept: PHARMACY | Facility: CLINIC | Age: 84
End: 2024-03-18
Payer: COMMERCIAL

## 2024-03-18 PROCEDURE — RXMED WILLOW AMBULATORY MEDICATION CHARGE

## 2024-04-15 ENCOUNTER — OFFICE VISIT (OUTPATIENT)
Dept: PRIMARY CARE | Facility: CLINIC | Age: 84
End: 2024-04-15
Payer: COMMERCIAL

## 2024-04-15 VITALS
WEIGHT: 152 LBS | BODY MASS INDEX: 21.81 KG/M2 | DIASTOLIC BLOOD PRESSURE: 79 MMHG | HEART RATE: 82 BPM | OXYGEN SATURATION: 95 % | TEMPERATURE: 97.3 F | SYSTOLIC BLOOD PRESSURE: 136 MMHG

## 2024-04-15 DIAGNOSIS — M75.51 ACUTE BURSITIS OF RIGHT SHOULDER: ICD-10-CM

## 2024-04-15 DIAGNOSIS — Z12.5 SCREENING PSA (PROSTATE SPECIFIC ANTIGEN): Primary | ICD-10-CM

## 2024-04-15 DIAGNOSIS — E78.2 MIXED HYPERLIPIDEMIA: ICD-10-CM

## 2024-04-15 PROCEDURE — 3075F SYST BP GE 130 - 139MM HG: CPT | Performed by: INTERNAL MEDICINE

## 2024-04-15 PROCEDURE — 1159F MED LIST DOCD IN RCRD: CPT | Performed by: INTERNAL MEDICINE

## 2024-04-15 PROCEDURE — 1125F AMNT PAIN NOTED PAIN PRSNT: CPT | Performed by: INTERNAL MEDICINE

## 2024-04-15 PROCEDURE — G0103 PSA SCREENING: HCPCS | Performed by: INTERNAL MEDICINE

## 2024-04-15 PROCEDURE — 3078F DIAST BP <80 MM HG: CPT | Performed by: INTERNAL MEDICINE

## 2024-04-15 PROCEDURE — 80061 LIPID PANEL: CPT | Performed by: INTERNAL MEDICINE

## 2024-04-15 PROCEDURE — 1160F RVW MEDS BY RX/DR IN RCRD: CPT | Performed by: INTERNAL MEDICINE

## 2024-04-15 PROCEDURE — 99212 OFFICE O/P EST SF 10 MIN: CPT | Performed by: INTERNAL MEDICINE

## 2024-04-15 RX ORDER — OXYCODONE AND ACETAMINOPHEN 5; 325 MG/1; MG/1
1 TABLET ORAL EVERY 6 HOURS PRN
Qty: 16 TABLET | Refills: 0 | Status: SHIPPED | OUTPATIENT
Start: 2024-04-15 | End: 2024-04-19

## 2024-04-15 ASSESSMENT — ENCOUNTER SYMPTOMS
OCCASIONAL FEELINGS OF UNSTEADINESS: 0
DEPRESSION: 1
LOSS OF SENSATION IN FEET: 0

## 2024-04-15 ASSESSMENT — PAIN SCALES - GENERAL: PAINLEVEL: 8

## 2024-04-15 ASSESSMENT — PATIENT HEALTH QUESTIONNAIRE - PHQ9
SUM OF ALL RESPONSES TO PHQ9 QUESTIONS 1 AND 2: 0
1. LITTLE INTEREST OR PLEASURE IN DOING THINGS: NOT AT ALL
2. FEELING DOWN, DEPRESSED OR HOPELESS: NOT AT ALL

## 2024-04-15 NOTE — PROGRESS NOTES
Subjective   Patient ID: Chetan Luna is a 84 y.o. male who presents for Follow-up (Right shoulder pain, bladder issue- unable to hold water, c/o haziness in eyes.  None of theses issues are getting better) and Med Refill.    HPI   84 years old male comes in to see me today complaining of severe right shoulder pain.  He was seen in the emergency room a month ago and given cortisone injection for bursitis.  It did not go away and it is getting worse like a toothache and he is not able to sleep.  Unfortunately find out he is taking Xarelto 10 mg a day.  I suggested to stop taking it for the coming 4 days and come back on a Friday evening or afternoon and will give him the injection and then hopefully avoiding any bleed in the joint.  History of heart disease, hyperlipidemia, hypertension, diabetes mellitus type 2, BPH, peripheral vascular disease, heart failure in the past.  Review of Systems  12 system review 12 system are negative.  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  Nonicteric sclera no jaundice.  Neck supple no masses no lymph node thyromegaly or jugular venous distention.  Lungs clear no rales wheezing or crackles.  Heart normal S1 and S2 regular rhythm.  Abdomen benign neurologically intact.  Examination of the right shoulder reveals severe pain on palpations in the anterior aspect of the shoulder and then also on the top of the shoulder itself painful to palpation and to pressure.  Diminished range of motion.  So the plan is to stop Xarelto for the coming 4 days and will see you on Friday at around 4:30 PM for the injection  Assessment/Plan   Diagnoses and all orders for this visit:  Screening PSA (prostate specific  antigen)  -     Prostate Specific Antigen, Screen  -     Prostate Specific Antigen, Screen  Acute bursitis of right shoulder  Mixed hyperlipidemia  -     Lipid Panel  -     Lipid Panel

## 2024-04-16 ENCOUNTER — HOSPITAL ENCOUNTER (OUTPATIENT)
Dept: CARDIOLOGY | Facility: CLINIC | Age: 84
Discharge: HOME | End: 2024-04-16
Payer: COMMERCIAL

## 2024-04-16 DIAGNOSIS — Z95.810 CARDIAC DEFIBRILLATOR IN PLACE: ICD-10-CM

## 2024-04-16 DIAGNOSIS — I42.9 CARDIOMYOPATHY, UNSPECIFIED TYPE (MULTI): Primary | ICD-10-CM

## 2024-04-16 DIAGNOSIS — I42.9 CARDIOMYOPATHY, UNSPECIFIED TYPE (MULTI): ICD-10-CM

## 2024-04-16 PROCEDURE — 93284 PRGRMG EVAL IMPLANTABLE DFB: CPT

## 2024-04-16 PROCEDURE — 93284 PRGRMG EVAL IMPLANTABLE DFB: CPT | Performed by: INTERNAL MEDICINE

## 2024-04-19 ENCOUNTER — OFFICE VISIT (OUTPATIENT)
Dept: PRIMARY CARE | Facility: CLINIC | Age: 84
End: 2024-04-19
Payer: COMMERCIAL

## 2024-04-19 VITALS
BODY MASS INDEX: 21.24 KG/M2 | TEMPERATURE: 97 F | OXYGEN SATURATION: 98 % | HEART RATE: 72 BPM | DIASTOLIC BLOOD PRESSURE: 76 MMHG | WEIGHT: 148 LBS | SYSTOLIC BLOOD PRESSURE: 138 MMHG

## 2024-04-19 DIAGNOSIS — I10 HYPERTENSION, UNSPECIFIED TYPE: ICD-10-CM

## 2024-04-19 DIAGNOSIS — M75.51 BURSITIS OF RIGHT SHOULDER: Primary | ICD-10-CM

## 2024-04-19 PROCEDURE — 1160F RVW MEDS BY RX/DR IN RCRD: CPT | Performed by: INTERNAL MEDICINE

## 2024-04-19 PROCEDURE — 3075F SYST BP GE 130 - 139MM HG: CPT | Performed by: INTERNAL MEDICINE

## 2024-04-19 PROCEDURE — 1159F MED LIST DOCD IN RCRD: CPT | Performed by: INTERNAL MEDICINE

## 2024-04-19 PROCEDURE — 99214 OFFICE O/P EST MOD 30 MIN: CPT | Performed by: INTERNAL MEDICINE

## 2024-04-19 PROCEDURE — 3078F DIAST BP <80 MM HG: CPT | Performed by: INTERNAL MEDICINE

## 2024-04-19 RX ORDER — HYDRALAZINE HYDROCHLORIDE 100 MG/1
100 TABLET, FILM COATED ORAL 3 TIMES DAILY
Qty: 270 TABLET | Refills: 3 | Status: SHIPPED | OUTPATIENT
Start: 2024-04-19 | End: 2025-04-19

## 2024-04-19 RX ORDER — TRIAMCINOLONE ACETONIDE 40 MG/ML
80 INJECTION, SUSPENSION INTRA-ARTICULAR; INTRAMUSCULAR ONCE
Status: COMPLETED | OUTPATIENT
Start: 2024-04-19 | End: 2024-04-19

## 2024-04-19 RX ADMIN — TRIAMCINOLONE ACETONIDE 80 MG: 40 INJECTION, SUSPENSION INTRA-ARTICULAR; INTRAMUSCULAR at 18:40

## 2024-04-19 ASSESSMENT — ENCOUNTER SYMPTOMS
LOSS OF SENSATION IN FEET: 0
DEPRESSION: 0
OCCASIONAL FEELINGS OF UNSTEADINESS: 0

## 2024-04-19 ASSESSMENT — PATIENT HEALTH QUESTIONNAIRE - PHQ9
SUM OF ALL RESPONSES TO PHQ9 QUESTIONS 1 AND 2: 0
2. FEELING DOWN, DEPRESSED OR HOPELESS: NOT AT ALL
1. LITTLE INTEREST OR PLEASURE IN DOING THINGS: NOT AT ALL

## 2024-04-19 NOTE — PROGRESS NOTES
Subjective   Patient ID: Chetan Luna is a 84 y.o. male who presents for Injections (Right shoulder pain) and Med Refill (Hydralazine 100 mg TID).    HPI   84 years old male comes back today to see me for acute right shoulder bursitis after being off Xarelto.  He has been off for 4 days.  And he is to start again on Xarelto by tomorrow morning or tomorrow during the day whenever he takes it.  Has severe pain on the right shoulder and to location or spot.  He agreed to the shot of cortisone or Kenalog with lidocaine.  Review of Systems  12 system review 12 system  Objective   /76 (BP Location: Right arm, Patient Position: Sitting, BP Cuff Size: Adult)   Pulse 72   Temp 36.1 °C (97 °F) (Temporal)   Wt 67.1 kg (148 lb)   SpO2 98%   BMI 21.24 kg/m²     Physical Exam  Negative.  No change on his physical exam.  Blood pressure 138/76.  Weight 148 pounds a loss of 4 pounds.  After cleaning the right shoulder with Betadine and peroxide and numbing the whole area with the analgesic spray and injection of a total of 2 cc of mixed lidocaine and 40 mg of Kenalog injected in the right shoulder to cc in each location.  Tolerated well the procedure and he did stay in good spirit and in good form.  Massages and slow movement on the right shoulder was provided.  And explained to the patient.  Assessment/Plan   Diagnoses and all orders for this visit:  Bursitis of right shoulder  -     triamcinolone acetonide (Kenalog-40) injection 80 mg

## 2024-05-07 ENCOUNTER — HOSPITAL ENCOUNTER (OUTPATIENT)
Facility: HOSPITAL | Age: 84
Setting detail: OBSERVATION
Discharge: HOME | End: 2024-05-08
Attending: EMERGENCY MEDICINE | Admitting: INTERNAL MEDICINE
Payer: COMMERCIAL

## 2024-05-07 ENCOUNTER — APPOINTMENT (OUTPATIENT)
Dept: RADIOLOGY | Facility: HOSPITAL | Age: 84
End: 2024-05-07
Payer: COMMERCIAL

## 2024-05-07 ENCOUNTER — APPOINTMENT (OUTPATIENT)
Dept: CARDIOLOGY | Facility: HOSPITAL | Age: 84
End: 2024-05-07
Payer: COMMERCIAL

## 2024-05-07 DIAGNOSIS — I50.40 UNSPECIFIED COMBINED SYSTOLIC (CONGESTIVE) AND DIASTOLIC (CONGESTIVE) HEART FAILURE (MULTI): ICD-10-CM

## 2024-05-07 DIAGNOSIS — N18.9 CHRONIC KIDNEY DISEASE, UNSPECIFIED CKD STAGE: ICD-10-CM

## 2024-05-07 DIAGNOSIS — R07.9 CHEST PAIN, UNSPECIFIED TYPE: Primary | ICD-10-CM

## 2024-05-07 LAB
ALBUMIN SERPL BCP-MCNC: 4.3 G/DL (ref 3.4–5)
ALP SERPL-CCNC: 59 U/L (ref 33–136)
ALT SERPL W P-5'-P-CCNC: 17 U/L (ref 10–52)
ANION GAP SERPL CALC-SCNC: 15 MMOL/L (ref 10–20)
AST SERPL W P-5'-P-CCNC: 18 U/L (ref 9–39)
BASOPHILS # BLD AUTO: 0.03 X10*3/UL (ref 0–0.1)
BASOPHILS NFR BLD AUTO: 0.5 %
BILIRUB SERPL-MCNC: 0.9 MG/DL (ref 0–1.2)
BNP SERPL-MCNC: 582 PG/ML (ref 0–99)
BUN SERPL-MCNC: 37 MG/DL (ref 6–23)
CALCIUM SERPL-MCNC: 9 MG/DL (ref 8.6–10.3)
CARDIAC TROPONIN I PNL SERPL HS: 46 NG/L (ref 0–20)
CARDIAC TROPONIN I PNL SERPL HS: 50 NG/L (ref 0–20)
CHLORIDE SERPL-SCNC: 102 MMOL/L (ref 98–107)
CO2 SERPL-SCNC: 23 MMOL/L (ref 21–32)
CREAT SERPL-MCNC: 1.94 MG/DL (ref 0.5–1.3)
EGFRCR SERPLBLD CKD-EPI 2021: 34 ML/MIN/1.73M*2
EOSINOPHIL # BLD AUTO: 0.04 X10*3/UL (ref 0–0.4)
EOSINOPHIL NFR BLD AUTO: 0.6 %
ERYTHROCYTE [DISTWIDTH] IN BLOOD BY AUTOMATED COUNT: 17.2 % (ref 11.5–14.5)
GLUCOSE BLD MANUAL STRIP-MCNC: 107 MG/DL (ref 74–99)
GLUCOSE BLD MANUAL STRIP-MCNC: 107 MG/DL (ref 74–99)
GLUCOSE SERPL-MCNC: 109 MG/DL (ref 74–99)
HCT VFR BLD AUTO: 39.2 % (ref 41–52)
HGB BLD-MCNC: 13.5 G/DL (ref 13.5–17.5)
IMM GRANULOCYTES # BLD AUTO: 0.05 X10*3/UL (ref 0–0.5)
IMM GRANULOCYTES NFR BLD AUTO: 0.8 % (ref 0–0.9)
LYMPHOCYTES # BLD AUTO: 1.18 X10*3/UL (ref 0.8–3)
LYMPHOCYTES NFR BLD AUTO: 18.3 %
MCH RBC QN AUTO: 27.4 PG (ref 26–34)
MCHC RBC AUTO-ENTMCNC: 34.4 G/DL (ref 32–36)
MCV RBC AUTO: 80 FL (ref 80–100)
MONOCYTES # BLD AUTO: 0.45 X10*3/UL (ref 0.05–0.8)
MONOCYTES NFR BLD AUTO: 7 %
NEUTROPHILS # BLD AUTO: 4.7 X10*3/UL (ref 1.6–5.5)
NEUTROPHILS NFR BLD AUTO: 72.8 %
NRBC BLD-RTO: 0 /100 WBCS (ref 0–0)
PLATELET # BLD AUTO: 196 X10*3/UL (ref 150–450)
POTASSIUM SERPL-SCNC: 3.6 MMOL/L (ref 3.5–5.3)
PROT SERPL-MCNC: 7.2 G/DL (ref 6.4–8.2)
RBC # BLD AUTO: 4.92 X10*6/UL (ref 4.5–5.9)
SODIUM SERPL-SCNC: 136 MMOL/L (ref 136–145)
WBC # BLD AUTO: 6.5 X10*3/UL (ref 4.4–11.3)

## 2024-05-07 PROCEDURE — 71046 X-RAY EXAM CHEST 2 VIEWS: CPT

## 2024-05-07 PROCEDURE — G0378 HOSPITAL OBSERVATION PER HR: HCPCS

## 2024-05-07 PROCEDURE — 80053 COMPREHEN METABOLIC PANEL: CPT | Performed by: PHYSICIAN ASSISTANT

## 2024-05-07 PROCEDURE — 85025 COMPLETE CBC W/AUTO DIFF WBC: CPT | Performed by: PHYSICIAN ASSISTANT

## 2024-05-07 PROCEDURE — 2500000001 HC RX 250 WO HCPCS SELF ADMINISTERED DRUGS (ALT 637 FOR MEDICARE OP): Performed by: EMERGENCY MEDICINE

## 2024-05-07 PROCEDURE — 2500000006 HC RX 250 W HCPCS SELF ADMINISTERED DRUGS (ALT 637 FOR ALL PAYERS): Mod: MUE

## 2024-05-07 PROCEDURE — 71046 X-RAY EXAM CHEST 2 VIEWS: CPT | Mod: FOREIGN READ | Performed by: RADIOLOGY

## 2024-05-07 PROCEDURE — 99285 EMERGENCY DEPT VISIT HI MDM: CPT | Mod: 25 | Performed by: EMERGENCY MEDICINE

## 2024-05-07 PROCEDURE — 82947 ASSAY GLUCOSE BLOOD QUANT: CPT | Mod: 91

## 2024-05-07 PROCEDURE — 2500000001 HC RX 250 WO HCPCS SELF ADMINISTERED DRUGS (ALT 637 FOR MEDICARE OP)

## 2024-05-07 PROCEDURE — 93005 ELECTROCARDIOGRAM TRACING: CPT

## 2024-05-07 PROCEDURE — 83036 HEMOGLOBIN GLYCOSYLATED A1C: CPT | Mod: AHULAB

## 2024-05-07 PROCEDURE — 83880 ASSAY OF NATRIURETIC PEPTIDE: CPT

## 2024-05-07 PROCEDURE — 36415 COLL VENOUS BLD VENIPUNCTURE: CPT | Performed by: PHYSICIAN ASSISTANT

## 2024-05-07 PROCEDURE — 84484 ASSAY OF TROPONIN QUANT: CPT | Performed by: PHYSICIAN ASSISTANT

## 2024-05-07 RX ORDER — FUROSEMIDE 40 MG/1
40 TABLET ORAL
Status: DISCONTINUED | OUTPATIENT
Start: 2024-05-07 | End: 2024-05-08 | Stop reason: HOSPADM

## 2024-05-07 RX ORDER — NITROGLYCERIN 0.4 MG/1
0.4 TABLET SUBLINGUAL EVERY 5 MIN PRN
Status: DISCONTINUED | OUTPATIENT
Start: 2024-05-07 | End: 2024-05-08 | Stop reason: HOSPADM

## 2024-05-07 RX ORDER — HYDRALAZINE HYDROCHLORIDE 50 MG/1
100 TABLET, FILM COATED ORAL 3 TIMES DAILY
Status: DISCONTINUED | OUTPATIENT
Start: 2024-05-07 | End: 2024-05-08 | Stop reason: HOSPADM

## 2024-05-07 RX ORDER — OXYCODONE AND ACETAMINOPHEN 5; 325 MG/1; MG/1
1 TABLET ORAL EVERY 6 HOURS PRN
COMMUNITY

## 2024-05-07 RX ORDER — DEXTROSE 50 % IN WATER (D50W) INTRAVENOUS SYRINGE
25
Status: DISCONTINUED | OUTPATIENT
Start: 2024-05-07 | End: 2024-05-08 | Stop reason: HOSPADM

## 2024-05-07 RX ORDER — TAMSULOSIN HYDROCHLORIDE 0.4 MG/1
0.4 CAPSULE ORAL DAILY
Status: DISCONTINUED | OUTPATIENT
Start: 2024-05-07 | End: 2024-05-08 | Stop reason: HOSPADM

## 2024-05-07 RX ORDER — ISOSORBIDE DINITRATE 20 MG/1
20 TABLET ORAL EVERY 8 HOURS
COMMUNITY
End: 2024-06-03 | Stop reason: SDUPTHER

## 2024-05-07 RX ORDER — NAPROXEN SODIUM 220 MG/1
324 TABLET, FILM COATED ORAL ONCE
Status: COMPLETED | OUTPATIENT
Start: 2024-05-07 | End: 2024-05-07

## 2024-05-07 RX ORDER — ASPIRIN 81 MG/1
81 TABLET ORAL DAILY
COMMUNITY
End: 2024-06-03 | Stop reason: SDUPTHER

## 2024-05-07 RX ORDER — INSULIN LISPRO 100 [IU]/ML
0-5 INJECTION, SOLUTION INTRAVENOUS; SUBCUTANEOUS
Status: DISCONTINUED | OUTPATIENT
Start: 2024-05-07 | End: 2024-05-08 | Stop reason: HOSPADM

## 2024-05-07 RX ORDER — DEXTROSE 50 % IN WATER (D50W) INTRAVENOUS SYRINGE
12.5
Status: DISCONTINUED | OUTPATIENT
Start: 2024-05-07 | End: 2024-05-08 | Stop reason: HOSPADM

## 2024-05-07 RX ORDER — ACETAMINOPHEN 650 MG/1
650 SUPPOSITORY RECTAL EVERY 4 HOURS PRN
Status: DISCONTINUED | OUTPATIENT
Start: 2024-05-07 | End: 2024-05-08 | Stop reason: HOSPADM

## 2024-05-07 RX ORDER — ISOSORBIDE DINITRATE 20 MG/1
20 TABLET ORAL EVERY 8 HOURS
Status: DISCONTINUED | OUTPATIENT
Start: 2024-05-07 | End: 2024-05-08 | Stop reason: HOSPADM

## 2024-05-07 RX ORDER — ACETAMINOPHEN 160 MG/5ML
650 SOLUTION ORAL EVERY 4 HOURS PRN
Status: DISCONTINUED | OUTPATIENT
Start: 2024-05-07 | End: 2024-05-08 | Stop reason: HOSPADM

## 2024-05-07 RX ORDER — CARVEDILOL 25 MG/1
50 TABLET ORAL 2 TIMES DAILY
Status: DISCONTINUED | OUTPATIENT
Start: 2024-05-07 | End: 2024-05-08 | Stop reason: HOSPADM

## 2024-05-07 RX ORDER — HYDRALAZINE HYDROCHLORIDE 100 MG/1
100 TABLET, FILM COATED ORAL 3 TIMES DAILY
COMMUNITY

## 2024-05-07 RX ORDER — ACETAMINOPHEN 325 MG/1
650 TABLET ORAL EVERY 4 HOURS PRN
Status: DISCONTINUED | OUTPATIENT
Start: 2024-05-07 | End: 2024-05-08 | Stop reason: HOSPADM

## 2024-05-07 RX ORDER — ATORVASTATIN CALCIUM 40 MG/1
40 TABLET, FILM COATED ORAL NIGHTLY
COMMUNITY

## 2024-05-07 RX ORDER — CARVEDILOL 25 MG/1
50 TABLET ORAL 2 TIMES DAILY
COMMUNITY
End: 2024-06-03 | Stop reason: SDUPTHER

## 2024-05-07 RX ORDER — ATORVASTATIN CALCIUM 40 MG/1
40 TABLET, FILM COATED ORAL NIGHTLY
Status: DISCONTINUED | OUTPATIENT
Start: 2024-05-07 | End: 2024-05-08 | Stop reason: HOSPADM

## 2024-05-07 RX ORDER — NAPROXEN SODIUM 220 MG/1
81 TABLET, FILM COATED ORAL DAILY
Status: DISCONTINUED | OUTPATIENT
Start: 2024-05-08 | End: 2024-05-08 | Stop reason: HOSPADM

## 2024-05-07 RX ORDER — FUROSEMIDE 40 MG/1
3 TABLET ORAL DAILY
COMMUNITY

## 2024-05-07 RX ORDER — POLYETHYLENE GLYCOL 3350 17 G/17G
17 POWDER, FOR SOLUTION ORAL DAILY PRN
Status: DISCONTINUED | OUTPATIENT
Start: 2024-05-07 | End: 2024-05-08 | Stop reason: HOSPADM

## 2024-05-07 RX ORDER — TAMSULOSIN HYDROCHLORIDE 0.4 MG/1
0.4 CAPSULE ORAL NIGHTLY
COMMUNITY

## 2024-05-07 RX ADMIN — ATORVASTATIN CALCIUM 40 MG: 40 TABLET, FILM COATED ORAL at 20:36

## 2024-05-07 RX ADMIN — CARVEDILOL 50 MG: 25 TABLET, FILM COATED ORAL at 20:36

## 2024-05-07 RX ADMIN — TAMSULOSIN HYDROCHLORIDE 0.4 MG: 0.4 CAPSULE ORAL at 19:30

## 2024-05-07 RX ADMIN — ASPIRIN 324 MG: 81 TABLET, CHEWABLE ORAL at 15:33

## 2024-05-07 RX ADMIN — HYDRALAZINE HYDROCHLORIDE 100 MG: 50 TABLET ORAL at 20:36

## 2024-05-07 RX ADMIN — ISOSORBIDE DINITRATE 20 MG: 20 TABLET ORAL at 20:36

## 2024-05-07 SDOH — SOCIAL STABILITY: SOCIAL INSECURITY: WERE YOU ABLE TO COMPLETE ALL THE BEHAVIORAL HEALTH SCREENINGS?: YES

## 2024-05-07 SDOH — SOCIAL STABILITY: SOCIAL INSECURITY: HAVE YOU HAD THOUGHTS OF HARMING ANYONE ELSE?: NO

## 2024-05-07 SDOH — SOCIAL STABILITY: SOCIAL INSECURITY: HAS ANYONE EVER THREATENED TO HURT YOUR FAMILY OR YOUR PETS?: NO

## 2024-05-07 SDOH — SOCIAL STABILITY: SOCIAL INSECURITY: DO YOU FEEL ANYONE HAS EXPLOITED OR TAKEN ADVANTAGE OF YOU FINANCIALLY OR OF YOUR PERSONAL PROPERTY?: NO

## 2024-05-07 SDOH — SOCIAL STABILITY: SOCIAL INSECURITY: DOES ANYONE TRY TO KEEP YOU FROM HAVING/CONTACTING OTHER FRIENDS OR DOING THINGS OUTSIDE YOUR HOME?: NO

## 2024-05-07 SDOH — SOCIAL STABILITY: SOCIAL INSECURITY: HAVE YOU HAD ANY THOUGHTS OF HARMING ANYONE ELSE?: NO

## 2024-05-07 SDOH — SOCIAL STABILITY: SOCIAL INSECURITY: ARE THERE ANY APPARENT SIGNS OF INJURIES/BEHAVIORS THAT COULD BE RELATED TO ABUSE/NEGLECT?: NO

## 2024-05-07 SDOH — SOCIAL STABILITY: SOCIAL INSECURITY: ARE YOU OR HAVE YOU BEEN THREATENED OR ABUSED PHYSICALLY, EMOTIONALLY, OR SEXUALLY BY ANYONE?: NO

## 2024-05-07 SDOH — SOCIAL STABILITY: SOCIAL INSECURITY: ABUSE: ADULT

## 2024-05-07 SDOH — SOCIAL STABILITY: SOCIAL INSECURITY: DO YOU FEEL UNSAFE GOING BACK TO THE PLACE WHERE YOU ARE LIVING?: NO

## 2024-05-07 ASSESSMENT — PAIN - FUNCTIONAL ASSESSMENT
PAIN_FUNCTIONAL_ASSESSMENT: 0-10
PAIN_FUNCTIONAL_ASSESSMENT: 0-10

## 2024-05-07 ASSESSMENT — COGNITIVE AND FUNCTIONAL STATUS - GENERAL
PATIENT BASELINE BEDBOUND: NO
DAILY ACTIVITIY SCORE: 24
MOBILITY SCORE: 24
DAILY ACTIVITIY SCORE: 24
MOBILITY SCORE: 24

## 2024-05-07 ASSESSMENT — ACTIVITIES OF DAILY LIVING (ADL)
TOILETING: INDEPENDENT
HEARING - RIGHT EAR: FUNCTIONAL
WALKS IN HOME: INDEPENDENT
JUDGMENT_ADEQUATE_SAFELY_COMPLETE_DAILY_ACTIVITIES: YES
FEEDING YOURSELF: INDEPENDENT
PATIENT'S MEMORY ADEQUATE TO SAFELY COMPLETE DAILY ACTIVITIES?: YES
ADEQUATE_TO_COMPLETE_ADL: YES
DRESSING YOURSELF: INDEPENDENT
GROOMING: INDEPENDENT
HEARING - LEFT EAR: FUNCTIONAL
BATHING: INDEPENDENT

## 2024-05-07 ASSESSMENT — LIFESTYLE VARIABLES
HOW MANY STANDARD DRINKS CONTAINING ALCOHOL DO YOU HAVE ON A TYPICAL DAY: 1 OR 2
AUDIT-C TOTAL SCORE: 2
AUDIT-C TOTAL SCORE: 2
SKIP TO QUESTIONS 9-10: 1
HOW OFTEN DO YOU HAVE A DRINK CONTAINING ALCOHOL: 2-4 TIMES A MONTH
HOW OFTEN DO YOU HAVE 6 OR MORE DRINKS ON ONE OCCASION: NEVER

## 2024-05-07 ASSESSMENT — COLUMBIA-SUICIDE SEVERITY RATING SCALE - C-SSRS
1. IN THE PAST MONTH, HAVE YOU WISHED YOU WERE DEAD OR WISHED YOU COULD GO TO SLEEP AND NOT WAKE UP?: NO
2. HAVE YOU ACTUALLY HAD ANY THOUGHTS OF KILLING YOURSELF?: NO
6. HAVE YOU EVER DONE ANYTHING, STARTED TO DO ANYTHING, OR PREPARED TO DO ANYTHING TO END YOUR LIFE?: NO

## 2024-05-07 ASSESSMENT — PATIENT HEALTH QUESTIONNAIRE - PHQ9
SUM OF ALL RESPONSES TO PHQ9 QUESTIONS 1 & 2: 0
1. LITTLE INTEREST OR PLEASURE IN DOING THINGS: NOT AT ALL
2. FEELING DOWN, DEPRESSED OR HOPELESS: NOT AT ALL

## 2024-05-07 ASSESSMENT — PAIN SCALES - GENERAL
PAINLEVEL_OUTOF10: 0 - NO PAIN
PAINLEVEL_OUTOF10: 8
PAINLEVEL_OUTOF10: 8

## 2024-05-07 ASSESSMENT — HEART SCORE
RISK FACTORS: >2 RISK FACTORS OR HX OF ATHEROSCLEROTIC DISEASE
TROPONIN: 1-3 TIMES NORMAL LIMIT
ECG: NON-SPECIFIC REPOLARIZATION DISTURBANCE
AGE: 65+
HISTORY: MODERATELY SUSPICIOUS
HEART SCORE: 7

## 2024-05-07 NOTE — ED PROVIDER NOTES
HPI   Chief Complaint   Patient presents with    Chest Pain       This is an 84-year-old male who presents to the emergency department complaining of chest pain.  The patient reports chest pain that started this morning.  It runs across his chest.  He has no associated nausea, vomiting, shortness of breath or diaphoresis.  He reports it is similar to the pain he had prior to his heart attack although today's pain is mild comparatively.  At the time of his assessment in the emergency department, the pain is almost gone.  The patient also reports some mild abdominal pain last night which resolved.    Past medical history: CAD with stents, implanted ICD, diabetes, hypertension                            No data recorded                   Patient History   No past medical history on file.  No past surgical history on file.  No family history on file.  Social History     Tobacco Use    Smoking status: Not on file    Smokeless tobacco: Not on file   Substance Use Topics    Alcohol use: Not on file    Drug use: Not on file       Physical Exam   ED Triage Vitals [05/07/24 1323]   Temperature Heart Rate Respirations BP   36.7 °C (98.1 °F) 88 15 118/77      Pulse Ox Temp src Heart Rate Source Patient Position   99 % -- -- --      BP Location FiO2 (%)     -- --       Physical Exam  Vitals and nursing note reviewed.   HENT:      Head: Normocephalic and atraumatic.      Nose: Nose normal.   Eyes:      Conjunctiva/sclera: Conjunctivae normal.   Cardiovascular:      Rate and Rhythm: Normal rate and regular rhythm.      Pulses: Normal pulses.      Heart sounds: Normal heart sounds.   Pulmonary:      Effort: Pulmonary effort is normal.      Breath sounds: Normal breath sounds.   Abdominal:      General: Bowel sounds are normal.      Palpations: Abdomen is soft.   Musculoskeletal:         General: Normal range of motion.      Cervical back: Normal range of motion and neck supple.   Skin:     Findings: No rash.   Neurological:       General: No focal deficit present.      Mental Status: He is alert and oriented to person, place, and time.   Psychiatric:         Mood and Affect: Mood normal.         ED Course & MDM   Diagnoses as of 05/07/24 1726   Chest pain, unspecified type       Medical Decision Making  Differential diagnosis: I have considered the following conditions in my assessment of   this patient's condition:  GERD, musculoskeletal chest pain, aortic dissection, cholecystitis,   ACS, pericarditis, myocarditis, tamponade, shingles,  pneumonia, pneumothorax, pulmonary embolus.    This is an 84-year-old male who presents to the emergency department with chest pain.  He will be treated with aspirin and nitro.  He will be further evaluated with labs, chest x-ray and EKG.  EKG shows a paced rhythm and is unchanged from EKG in March under medical record #[id number].  The patient's initial troponin was 50.  Repeat troponin was 46.  On review of the patient's previous labs his last troponin on March fifth was 25.  Patient's creatinine was also elevated at 1.94.  The patient's previous creatinines have been above 2 but his last creatinine on March 5 was 1.63.  The patient requires admission to the hospital for further evaluation and management.  He will be placed in the observation unit.    Amount and/or Complexity of Data Reviewed  ECG/medicine tests: independent interpretation performed.     Details: AV paced, heart rate 78.        Procedure  Procedures     Calixto Otoole MD  05/07/24 1724

## 2024-05-07 NOTE — ED TRIAGE NOTES
TRIAGE NOTE   I saw the patient as the Clinician in Triage and performed a brief history and physical exam, established acuity, and ordered appropriate tests to develop basic plan of care. Patient will be seen by an RASHARD, resident and/or physician who will independently evaluate the patient. Please see subsequent provider notes for further details and disposition.     Brief HPI: In brief, Chetan Luna is a 84 y.o. male that presents for chest pain.  History of coronary artery disease prior MI pacer/defibrillator on Xarelto.  Chest pain started earlier this morning.  Waxing and waning but continuous.  No shortness of breath nausea vomiting or diaphoresis.  No syncope..     Focused Physical exam:   Vital signs are stable.  No distress alert and coherent.  Cardiovascular RRR.  Has pacemaker.  Lungs CTA.  No respiratory difficulty.  No increased work of breathing.    Plan/MDM:   Workup initiated.  EKG with paced rhythm.  Patient stable pending further evaluation and bed availability.  Please see subsequent provider note for further details and disposition

## 2024-05-07 NOTE — H&P
History Of Present Illness    NOTE: PATIENT HAS TWO MRN NUMBERS. CHART REVIEW FOR PATIENT IS UNDER MRN [id number].     84-year-old with a history of CAD, DVT s/p stent placement in leg, daily use of xarelto, afib ICD (device check scheduled for 7/16/24), T2DM, heart disease, BPH, PVD, HFrEF, hypertension, hyperlipidemia, intermittent claudication, ischemic cardiomyopathy, past MI, PAD, s/p CABG presented to [facility] ED today (5/7) with chest pain. He states the CP began around 3-4am before coming to the ED, and describes it as running along his chest. He denies any associated SOB, palpitations, abdominal pain, nausea, vomiting, pain in his arms, HA, lightheadedness, swelling in his legs, dark stools, blood in stools, pain with urination, blood in urine. He does report decreased appetite over the past year and some weight loss, denies any fever, chills, night sweats. Attributes decreased appetite to losing two loved ones in the past year, states he is still grieving and has support system, no thoughts of harming himself. He states the pain went away this afternoon, but came back shortly after which caused him to go to the ED to get checked out. Pain was resolved when this assessment was preformed. He regularly takes all his home medications. He confirms ETOH ~2x/wk, marijuana near daily, and denies illicit drug use.      PMH: CAD, DVT s/p stent placement in leg, daily use of xarelto, afib, ICD (device check scheduled for 7/16/24), T2DM, heart disease, BPH, PVD, HFrEF, hypertension, hyperlipidemia, intermittent claudication, ischemic cardiomyopathy, past MI, PAD, s/p CABG    ED course:  Troponin is 50 then 45. last troponin on 3/5 was 25  EKG is paced.  Heart score is 7.   given ASA and nitroglycerin  CXR: No evidence consolidating infiltrate or effusion. Lungs clear, no remarkable upper abdominal findings  EKG: AV paced rhythm HR 78 and is unchanged from EKG in March under medical record #[id number]   Cr elevated at 1.94,  "prior Cr have been above 2, but last Cr on 3/5 was 1.63    Surgical History  History reviewed. No pertinent surgical history.  Stent placement in leg  S/p CABG   ICD placement      Social History  Cigars some days  ETOH ~2x/wk  Marijuana near daily  No ilicit drugs     Family History  No family history on file.     Allergies  PCN, PCN G, streptomycin     Review of Systems  Per HPI     Physical Exam  Cardiovascular:      Rate and Rhythm: Normal rate and regular rhythm.      Heart sounds: No murmur heard.     No friction rub. No gallop.   Pulmonary:      Effort: Pulmonary effort is normal.      Breath sounds: Normal breath sounds. No wheezing, rhonchi or rales.   Abdominal:      General: Abdomen is flat. Bowel sounds are normal. There is no distension.      Palpations: Abdomen is soft.      Tenderness: There is no abdominal tenderness. There is no guarding.   Musculoskeletal:         General: Normal range of motion.      Right lower leg: No edema.      Left lower leg: No edema.   Skin:     General: Skin is warm and dry.   Neurological:      General: No focal deficit present.          Last Recorded Vitals  Blood pressure (!) 145/93, pulse 83, temperature 36.6 °C (97.9 °F), temperature source Temporal, resp. rate 17, height 1.778 m (5' 10\"), weight 68 kg (150 lb), SpO2 98%.    Relevant Results  Scheduled medications  [START ON 5/8/2024] aspirin, 81 mg, oral, Daily  atorvastatin, 40 mg, oral, Nightly  carvedilol, 50 mg, oral, BID  [Held by provider] furosemide, 40 mg, oral, TID  hydrALAZINE, 100 mg, oral, TID  isosorbide dinitrate, 20 mg, oral, q8h  [START ON 5/8/2024] rivaroxaban, 10 mg, oral, Daily with evening meal  tamsulosin, 0.4 mg, oral, Daily      Continuous medications     PRN medications  PRN medications: acetaminophen **OR** acetaminophen **OR** acetaminophen, nitroglycerin  Results for orders placed or performed during the hospital encounter of 05/07/24 (from the past 24 hour(s))   CBC and Auto Differential "   Result Value Ref Range    WBC 6.5 4.4 - 11.3 x10*3/uL    nRBC 0.0 0.0 - 0.0 /100 WBCs    RBC 4.92 4.50 - 5.90 x10*6/uL    Hemoglobin 13.5 13.5 - 17.5 g/dL    Hematocrit 39.2 (L) 41.0 - 52.0 %    MCV 80 80 - 100 fL    MCH 27.4 26.0 - 34.0 pg    MCHC 34.4 32.0 - 36.0 g/dL    RDW 17.2 (H) 11.5 - 14.5 %    Platelets 196 150 - 450 x10*3/uL    Neutrophils % 72.8 40.0 - 80.0 %    Immature Granulocytes %, Automated 0.8 0.0 - 0.9 %    Lymphocytes % 18.3 13.0 - 44.0 %    Monocytes % 7.0 2.0 - 10.0 %    Eosinophils % 0.6 0.0 - 6.0 %    Basophils % 0.5 0.0 - 2.0 %    Neutrophils Absolute 4.70 1.60 - 5.50 x10*3/uL    Immature Granulocytes Absolute, Automated 0.05 0.00 - 0.50 x10*3/uL    Lymphocytes Absolute 1.18 0.80 - 3.00 x10*3/uL    Monocytes Absolute 0.45 0.05 - 0.80 x10*3/uL    Eosinophils Absolute 0.04 0.00 - 0.40 x10*3/uL    Basophils Absolute 0.03 0.00 - 0.10 x10*3/uL   Comprehensive metabolic panel   Result Value Ref Range    Glucose 109 (H) 74 - 99 mg/dL    Sodium 136 136 - 145 mmol/L    Potassium 3.6 3.5 - 5.3 mmol/L    Chloride 102 98 - 107 mmol/L    Bicarbonate 23 21 - 32 mmol/L    Anion Gap 15 10 - 20 mmol/L    Urea Nitrogen 37 (H) 6 - 23 mg/dL    Creatinine 1.94 (H) 0.50 - 1.30 mg/dL    eGFR 34 (L) >60 mL/min/1.73m*2    Calcium 9.0 8.6 - 10.3 mg/dL    Albumin 4.3 3.4 - 5.0 g/dL    Alkaline Phosphatase 59 33 - 136 U/L    Total Protein 7.2 6.4 - 8.2 g/dL    AST 18 9 - 39 U/L    Bilirubin, Total 0.9 0.0 - 1.2 mg/dL    ALT 17 10 - 52 U/L   Troponin I, High Sensitivity, Initial   Result Value Ref Range    Troponin I, High Sensitivity 50 (H) 0 - 20 ng/L   B-type natriuretic peptide   Result Value Ref Range     (H) 0 - 99 pg/mL   ECG 12 lead   Result Value Ref Range    Ventricular Rate 78 BPM    Atrial Rate 78 BPM    VT Interval 168 ms    QRS Duration 162 ms    QT Interval 438 ms    QTC Calculation(Bazett) 499 ms    P Axis 63 degrees    R Axis 230 degrees    T Axis 25 degrees    QRS Count 13 beats    Q Onset 204  ms    P Onset 136 ms    P Offset 180 ms    T Offset 423 ms    QTC Fredericia 478 ms   Troponin, High Sensitivity, 1 Hour   Result Value Ref Range    Troponin I, High Sensitivity 46 (H) 0 - 20 ng/L   POCT GLUCOSE   Result Value Ref Range    POCT Glucose 107 (H) 74 - 99 mg/dL     ECG 12 lead    Result Date: 5/7/2024  AV dual-paced rhythm Biventricular pacemaker detected Abnormal ECG No previous ECGs available    XR chest 2 views    Result Date: 5/7/2024  STUDY: Chest Radiographs;  5/7/2024 at 2:24 PM. INDICATION: Chest pain. COMPARISON: None available. ACCESSION NUMBER(S): [id number] ORDERING CLINICIAN: IDRIS JUNE TECHNIQUE:  Frontal and lateral chest. FINDINGS: The patient status post median sternotomy with a multi lead pacemaker. CARDIOMEDIASTINAL SILHOUETTE: Cardiomediastinal silhouette is normal in size and configuration.  LUNGS: Lungs are clear.  ABDOMEN: No remarkable upper abdominal findings.  BONES: No acute osseous changes.    No evidence consolidating infiltrate or effusion. Signed by Jayden Tracy MD        Assessment/Plan   Principal Problem:    Chest pain      84-year-old with a history of CAD, CKD, DVT s/p stent placement in leg, daily use of xarelto, ICD (device check scheduled for 7/16/24), T2DM, heart disease, BPH, PVD, HF, hypertension, hyperlipidemia, incontinence, presented to [facility] ED today (5/7) with chest pain. He states the CP began around 3-4am before coming to the ED, and describes it as running along his chest. He denies any associated SOB, palpitations, abdominal pain, pain in his arms, HA, lightheadedness, and swelling in his legs. He states the pain went away this afternoon, but came back shortly after which caused him to go to the ED to get checked out. He regularly takes all his home medications. He confirms ETOH ~2x/wk, marijuana near daily, and denies illicit drug use.     Chest pain  -troponin 50 -> 46, elevated but flat  -CXR: No evidence consolidating infiltrate or  effusion. Lungs clear, no remarkable upper abdominal findings  -EKG in ED: AV paced rhythm HR 78 and is unchanged from EKG in March under medical record #[id number]   -last ECHO was 6 mo ago with LVSF 20% EF and severely dilated left ventricular cavity size, global hypokinesis of the L ventricle with regional variations, reduced right ventricular systolic function, mitral valve regurgitation, tricuspid valve regurgitation, elevated pulmonary artery pressure  -did not order D-dimer due to no SOB, no leg edema, no other clinical signs of PE, and pt reports compliance with xarelto, no tachycardia or hypoxia, don't suspect PE.   -BNP ordered  -Tele ordered  -cardiology consult ordered  -NPO at midnight  -adult cardiac diet      Hx DVT s/p stent placement in leg  CAD  ICD  Heart disease  PVD  HTN  HLD  -continue home medications:       ASA 81mg       Atorvastatin 40mg daily       Carvedilol 25mg - take 2 tablets 50 mg PO twice daily       Hydralazine 100mg - take 1 tab PO 3x/day       Isosorbide dinitrate 20mg - take 1 tab every 8hrs       Rivaroxaban 10mg take 1 tab daily with a full meal       Tamsulosin (flomax) 0.4mg 24hr capsule - take 1 capsule PO daily     CKD  -suspect KADI on CKD   -holding lasix, Cr 1.94 (most recent prior was 1.63 on 3/5)    DM  -updated A1c ordered  -Accu-Cheks ACHS  -Humalog correctional sliding scale  -held Sitagliptin phosphate (Januvia) 100mg tab - take one tab PO daily     VTE prophylaxis: no pharm prophylaxis already taking xarelto, SCDs    GI prophylaxis: miralax     Dispo: awaiting test results, cardio consult ordered.         I spent 55 minutes in the professional and overall care of this patient.      Debbie Rowan PA-C

## 2024-05-08 ENCOUNTER — APPOINTMENT (OUTPATIENT)
Dept: CARDIOLOGY | Facility: HOSPITAL | Age: 84
End: 2024-05-08
Payer: COMMERCIAL

## 2024-05-08 VITALS
TEMPERATURE: 97.3 F | RESPIRATION RATE: 18 BRPM | OXYGEN SATURATION: 99 % | HEIGHT: 70 IN | WEIGHT: 150 LBS | SYSTOLIC BLOOD PRESSURE: 125 MMHG | DIASTOLIC BLOOD PRESSURE: 88 MMHG | HEART RATE: 76 BPM | BODY MASS INDEX: 21.47 KG/M2

## 2024-05-08 LAB
ANION GAP SERPL CALC-SCNC: 16 MMOL/L (ref 10–20)
AORTIC VALVE MEAN GRADIENT: 2 MMHG
AORTIC VALVE PEAK VELOCITY: 0.83 M/S
APPEARANCE UR: CLEAR
ATRIAL RATE: 78 BPM
AV PEAK GRADIENT: 2.8 MMHG
AVA (PEAK VEL): 3.48 CM2
AVA (VTI): 3.05 CM2
BILIRUB UR STRIP.AUTO-MCNC: NEGATIVE MG/DL
BODY SURFACE AREA: 1.83 M2
BUN SERPL-MCNC: 43 MG/DL (ref 6–23)
CALCIUM SERPL-MCNC: 9.1 MG/DL (ref 8.6–10.3)
CHLORIDE SERPL-SCNC: 101 MMOL/L (ref 98–107)
CK SERPL-CCNC: 76 U/L (ref 0–325)
CO2 SERPL-SCNC: 23 MMOL/L (ref 21–32)
COLOR UR: NORMAL
CREAT SERPL-MCNC: 1.97 MG/DL (ref 0.5–1.3)
EGFRCR SERPLBLD CKD-EPI 2021: 33 ML/MIN/1.73M*2
EJECTION FRACTION APICAL 4 CHAMBER: 32.8
ERYTHROCYTE [DISTWIDTH] IN BLOOD BY AUTOMATED COUNT: 17.1 % (ref 11.5–14.5)
EST. AVERAGE GLUCOSE BLD GHB EST-MCNC: 157 MG/DL
GLUCOSE BLD MANUAL STRIP-MCNC: 124 MG/DL (ref 74–99)
GLUCOSE BLD MANUAL STRIP-MCNC: 131 MG/DL (ref 74–99)
GLUCOSE BLD MANUAL STRIP-MCNC: 134 MG/DL (ref 74–99)
GLUCOSE SERPL-MCNC: 126 MG/DL (ref 74–99)
GLUCOSE UR STRIP.AUTO-MCNC: NORMAL MG/DL
HBA1C MFR BLD: 7.1 %
HCT VFR BLD AUTO: 38.2 % (ref 41–52)
HGB BLD-MCNC: 13 G/DL (ref 13.5–17.5)
KETONES UR STRIP.AUTO-MCNC: NEGATIVE MG/DL
LEFT ATRIUM VOLUME AREA LENGTH INDEX BSA: 45.5 ML/M2
LEFT VENTRICLE INTERNAL DIMENSION DIASTOLE: 6.3 CM (ref 3.5–6)
LEFT VENTRICULAR OUTFLOW TRACT DIAMETER: 2.2 CM
LEUKOCYTE ESTERASE UR QL STRIP.AUTO: NEGATIVE
LV EJECTION FRACTION BIPLANE: 31 %
MCH RBC QN AUTO: 27.4 PG (ref 26–34)
MCHC RBC AUTO-ENTMCNC: 34 G/DL (ref 32–36)
MCV RBC AUTO: 81 FL (ref 80–100)
MITRAL VALVE E/A RATIO: 0.45
NITRITE UR QL STRIP.AUTO: NEGATIVE
NRBC BLD-RTO: 0 /100 WBCS (ref 0–0)
P AXIS: 63 DEGREES
P OFFSET: 180 MS
P ONSET: 136 MS
PH UR STRIP.AUTO: 6 [PH]
PLATELET # BLD AUTO: 220 X10*3/UL (ref 150–450)
POTASSIUM SERPL-SCNC: 4.1 MMOL/L (ref 3.5–5.3)
PR INTERVAL: 168 MS
PROT UR STRIP.AUTO-MCNC: NEGATIVE MG/DL
Q ONSET: 204 MS
QRS COUNT: 13 BEATS
QRS DURATION: 162 MS
QT INTERVAL: 438 MS
QTC CALCULATION(BAZETT): 499 MS
QTC FREDERICIA: 478 MS
R AXIS: 230 DEGREES
RBC # BLD AUTO: 4.74 X10*6/UL (ref 4.5–5.9)
RBC # UR STRIP.AUTO: NEGATIVE /UL
RIGHT VENTRICLE PEAK SYSTOLIC PRESSURE: 20.3 MMHG
SODIUM SERPL-SCNC: 136 MMOL/L (ref 136–145)
SP GR UR STRIP.AUTO: 1.01
T AXIS: 25 DEGREES
T OFFSET: 423 MS
TRICUSPID ANNULAR PLANE SYSTOLIC EXCURSION: 1.6 CM
UROBILINOGEN UR STRIP.AUTO-MCNC: NORMAL MG/DL
VENTRICULAR RATE: 78 BPM
WBC # BLD AUTO: 5.9 X10*3/UL (ref 4.4–11.3)

## 2024-05-08 PROCEDURE — 2500000004 HC RX 250 GENERAL PHARMACY W/ HCPCS (ALT 636 FOR OP/ED): Performed by: NURSE PRACTITIONER

## 2024-05-08 PROCEDURE — 93306 TTE W/DOPPLER COMPLETE: CPT | Performed by: INTERNAL MEDICINE

## 2024-05-08 PROCEDURE — 81003 URINALYSIS AUTO W/O SCOPE: CPT | Performed by: INTERNAL MEDICINE

## 2024-05-08 PROCEDURE — 82550 ASSAY OF CK (CPK): CPT | Performed by: INTERNAL MEDICINE

## 2024-05-08 PROCEDURE — 93306 TTE W/DOPPLER COMPLETE: CPT

## 2024-05-08 PROCEDURE — 82947 ASSAY GLUCOSE BLOOD QUANT: CPT | Mod: 59

## 2024-05-08 PROCEDURE — G0378 HOSPITAL OBSERVATION PER HR: HCPCS

## 2024-05-08 PROCEDURE — 36415 COLL VENOUS BLD VENIPUNCTURE: CPT

## 2024-05-08 PROCEDURE — 85027 COMPLETE CBC AUTOMATED: CPT

## 2024-05-08 PROCEDURE — 96374 THER/PROPH/DIAG INJ IV PUSH: CPT | Mod: 59 | Performed by: INTERNAL MEDICINE

## 2024-05-08 PROCEDURE — 2500000001 HC RX 250 WO HCPCS SELF ADMINISTERED DRUGS (ALT 637 FOR MEDICARE OP)

## 2024-05-08 PROCEDURE — 99223 1ST HOSP IP/OBS HIGH 75: CPT | Performed by: INTERNAL MEDICINE

## 2024-05-08 PROCEDURE — 82374 ASSAY BLOOD CARBON DIOXIDE: CPT

## 2024-05-08 PROCEDURE — 2500000006 HC RX 250 W HCPCS SELF ADMINISTERED DRUGS (ALT 637 FOR ALL PAYERS): Mod: MUE

## 2024-05-08 RX ORDER — FUROSEMIDE 10 MG/ML
80 INJECTION INTRAMUSCULAR; INTRAVENOUS ONCE
Status: COMPLETED | OUTPATIENT
Start: 2024-05-08 | End: 2024-05-08

## 2024-05-08 RX ORDER — FUROSEMIDE 10 MG/ML
INJECTION INTRAMUSCULAR; INTRAVENOUS
Status: DISCONTINUED
Start: 2024-05-08 | End: 2024-05-08 | Stop reason: HOSPADM

## 2024-05-08 RX ADMIN — ISOSORBIDE DINITRATE 20 MG: 20 TABLET ORAL at 12:29

## 2024-05-08 RX ADMIN — FUROSEMIDE 80 MG: 10 INJECTION, SOLUTION INTRAMUSCULAR; INTRAVENOUS at 11:21

## 2024-05-08 RX ADMIN — HYDRALAZINE HYDROCHLORIDE 100 MG: 50 TABLET ORAL at 09:29

## 2024-05-08 RX ADMIN — HYDRALAZINE HYDROCHLORIDE 100 MG: 50 TABLET ORAL at 16:03

## 2024-05-08 RX ADMIN — ISOSORBIDE DINITRATE 20 MG: 20 TABLET ORAL at 03:32

## 2024-05-08 RX ADMIN — TAMSULOSIN HYDROCHLORIDE 0.4 MG: 0.4 CAPSULE ORAL at 09:29

## 2024-05-08 RX ADMIN — RIVAROXABAN 10 MG: 2.5 TABLET, FILM COATED ORAL at 16:04

## 2024-05-08 RX ADMIN — CARVEDILOL 50 MG: 25 TABLET, FILM COATED ORAL at 09:29

## 2024-05-08 RX ADMIN — ASPIRIN 81 MG 81 MG: 81 TABLET ORAL at 09:29

## 2024-05-08 RX ADMIN — PERFLUTREN 10 ML OF DILUTION: 6.52 INJECTION, SUSPENSION INTRAVENOUS at 15:13

## 2024-05-08 ASSESSMENT — COGNITIVE AND FUNCTIONAL STATUS - GENERAL
MOBILITY SCORE: 24
DAILY ACTIVITIY SCORE: 24

## 2024-05-08 ASSESSMENT — PAIN SCALES - GENERAL
PAINLEVEL_OUTOF10: 0 - NO PAIN
PAINLEVEL_OUTOF10: 0 - NO PAIN

## 2024-05-08 ASSESSMENT — ACTIVITIES OF DAILY LIVING (ADL)
LACK_OF_TRANSPORTATION: NO
LACK_OF_TRANSPORTATION: NO

## 2024-05-08 NOTE — PROGRESS NOTES
Home with daughter      05/08/24 0943   Current Planned Discharge Disposition   Current Planned Discharge Disposition Home

## 2024-05-08 NOTE — CARE PLAN
The patient's goals for the shift include      The clinical goals for the shift include monitor blood pressure and heart rate

## 2024-05-08 NOTE — CONSULTS
"Inpatient consult to Cardiology  Consult performed by: Jaimie Barrera, APRN-CNP  Consult ordered by: Debbie Rowan PA-C  Reason for consult: chestp pain        Cards: Chivo Mccarthy (5/12/23)  History Of Present Illness:    84 yo BM w/ h/o CAD s/p CABG '86, s/p MI '84, ICM/HFrEF s/p ICD-CRT '06, pHTN, PAD s/p RLE stent, DVT, HTN, HLD, DM, CKD who Alex with chest pain.  Patient was admitted to observation.  He was given a heart score of 7 and cardiology was consulted for \"chest pain\"    According to the patient he developed chest pain yesterday early morning across the front of his chest.  He denies any radiation of the pain.  He claims it was a 9 out of 10 and was intermittent.  He denies any nausea, vomiting, lightheadedness, diaphoresis.  He said the pain was consistent so he decided to drive himself to the emergency room.  He said the pain went away last night to the middle of the night and has not returned.  He denies any kind of smoking, social alcohol, marijuana occasionally.    Afebrile, heart rate 79, blood pressure 131/88, 100% on room air.  On admission labs BUNs/CR 43/1.97(b/l 1.6-2.3), sensitivity troponins 50/46(previous troponins were 26 and 25), , H&H stable 13.0/38.2, chest x-ray is nonacute, EKG shows AV pacing.  Patient was given 324 mg of aspirin and made n.p.o.    Home cardiac medications include aspirin 81 daily milligrams daily, atorvastatin 40 mg daily, Coreg 25 mg twice daily, Lasix 80 mg daily, hydralazine 100 mg 3 times daily, isosorbide dinitrate 40 mg 3 times daily and Xarelto 10 mg daily(further DVT prevention; also CAD and very brief AFIB on ICD check in past).     Past Cardiology Tests (Last 3 Years):  Echo 2/20: mod LVE, EF 20-25%, antsep/apex AK, DD, sev LAE, mod MR, PASP 36  Echo 5/23: sev LVE, EF 20%, mild RV dysfxn, sev LAE, mod-sev MR, mod TR, PASP 84  Nuc 2/20: dist ant/apex scar, no ischemia, sev LVE, EF 16%  Nuc 4/22: ant/antsep/apex scar, no ischemia, sev LVE, " "EF 16%  ICD check : A pace 99%, V pace 93%, AF x1 (2m), NSVT x19 (1-2s)     Past Medical History:  He has no past medical history on file.    Past Surgical History:  He has no past surgical history on file.      Social History:  He reports that he has been smoking cigars. He has never used smokeless tobacco. No history on file for alcohol use and drug use.    Family History:  No family history on file.     Allergies:  Penicillins and Streptomycin    ROS:  10 point review of systems including (Constitutional, Eyes, ENMT, Respiratory, Cardiac, Gastrointestinal, Neurological, Psychiatric, and Hematologic) was performed and is otherwise negative.    Objective Data:  Last Recorded Vitals:  Vitals:    24 0011 24 0421 24 0746   BP: (!) 139/92 112/74 95/66 131/88   BP Location:    Right arm   Patient Position:    Lying   Pulse: 96 76 69 79   Resp: 18 18 18 17   Temp: 36.2 °C (97.2 °F) 37.2 °C (99 °F) 36.2 °C (97.2 °F) 36.4 °C (97.5 °F)   TempSrc:  Temporal Temporal Temporal   SpO2: 96% 100% 97% 100%   Weight:       Height:         Medical Gas Therapy: None (Room air)  Weight  Av kg (150 lb)  Min: 68 kg (150 lb)  Max: 68 kg (150 lb)      LABS:  CMP:  Results from last 7 days   Lab Units 24  0432 24  1403   SODIUM mmol/L 136 136   POTASSIUM mmol/L 4.1 3.6   CHLORIDE mmol/L 101 102   CO2 mmol/L 23 23   ANION GAP mmol/L 16 15   BUN mg/dL 43* 37*   CREATININE mg/dL 1.97* 1.94*   EGFR mL/min/1.73m*2 33* 34*   ALBUMIN g/dL  --  4.3   ALT U/L  --  17   AST U/L  --  18   BILIRUBIN TOTAL mg/dL  --  0.9     CBC:  Results from last 7 days   Lab Units 24  0432 24  1403   WBC AUTO x10*3/uL 5.9 6.5   HEMOGLOBIN g/dL 13.0* 13.5   HEMATOCRIT % 38.2* 39.2*   PLATELETS AUTO x10*3/uL 220 196   MCV fL 81 80     COAG:     ABO: No results found for: \"ABO\"  HEME/ENDO:  Results from last 7 days   Lab Units 24  1403   HEMOGLOBIN A1C % 7.1*      CARDIAC:   Results from last 7 days "   Lab Units 05/07/24  1547 05/07/24  1403   TROPHS ng/L 46* 50*   BNP pg/mL  --  582*      Results from last 7 days   Lab Units 05/07/24  1403   HEMOGLOBIN A1C % 7.1*        Last I/O:  No intake or output data in the 24 hours ending 05/08/24 0821  Net IO Since Admission: No IO data has been entered for this period [05/08/24 0821]      Imaging Results:  ECG 12 lead    Result Date: 5/7/2024  AV dual-paced rhythm Biventricular pacemaker detected Abnormal ECG No previous ECGs available    XR chest 2 views    Result Date: 5/7/2024  STUDY: Chest Radiographs;  5/7/2024 at 2:24 PM. INDICATION: Chest pain. COMPARISON: None available. ACCESSION NUMBER(S): [id number] ORDERING CLINICIAN: IDRIS JUNE TECHNIQUE:  Frontal and lateral chest. FINDINGS: The patient status post median sternotomy with a multi lead pacemaker. CARDIOMEDIASTINAL SILHOUETTE: Cardiomediastinal silhouette is normal in size and configuration.  LUNGS: Lungs are clear.  ABDOMEN: No remarkable upper abdominal findings.  BONES: No acute osseous changes.    No evidence consolidating infiltrate or effusion. Signed by Jayden Tracy MD      Inpatient Medications:  Scheduled medications   Medication Dose Route Frequency    aspirin  81 mg oral Daily    atorvastatin  40 mg oral Nightly    carvedilol  50 mg oral BID    [Held by provider] furosemide  40 mg oral TID    hydrALAZINE  100 mg oral TID    insulin lispro  0-5 Units subcutaneous Before meals & nightly    isosorbide dinitrate  20 mg oral q8h    rivaroxaban  10 mg oral Daily with evening meal    tamsulosin  0.4 mg oral Daily     PRN medications   Medication    acetaminophen    Or    acetaminophen    Or    acetaminophen    dextrose    dextrose    glucagon    glucagon    nitroglycerin    polyethylene glycol     Continuous Medications   Medication Dose Last Rate       Outpatient Medications:  Current Outpatient Medications   Medication Instructions    aspirin 81 mg, oral, Daily    atorvastatin (LIPITOR) 40 mg,  "oral, Nightly    carvedilol (COREG) 50 mg, oral, 2 times daily    furosemide (Lasix) 40 mg tablet 3 tablets, oral, Daily    hydrALAZINE (APRESOLINE) 100 mg, oral, 3 times daily    isosorbide dinitrate (ISORDIL) 20 mg, oral, Every 8 hours    oxyCODONE-acetaminophen (Percocet) 5-325 mg tablet 1 tablet, oral, Every 6 hours PRN    rivaroxaban (XARELTO) 10 mg, oral, Daily    SITagliptin phosphate (JANUVIA) 100 mg, oral, Daily    tamsulosin (FLOMAX) 0.4 mg, oral, Nightly       Physical Exam:  General:  Patient is awake, alert, and oriented.  Patient is in no acute distress.  HEENT:  Pupils equal and reactive.  Normocephalic.  Moist mucosa.    Neck:  No thyromegaly.  Normal Jugular Venous Pressure.  Cardiovascular:  Regular rate and rhythm.  Normal S1 and S2.  Pulmonary:  Clear to auscultation bilaterally.  Abdomen:  Soft. Non-tender.   Non-distended.  Positive bowel sounds.  Lower Extremities:  2+ pedal pulses. No LE edema.  Neurologic:  Cranial nerves intact.  No focal deficit.   Skin: Skin warm and dry, normal skin turgor.   Psychiatric: Normal affect.     Assessment/Plan   Cards: Chivo Mccarthy (5/12/23)  History Of Present Illness:    84 yo BM w/ h/o CAD s/p CABG '86, s/p MI '84, ICM/HFrEF s/p ICD-CRT '06, pHTN, PAD s/p RLE stent, DVT, HTN, HLD, DM, CKD who Alex with chest pain.  Patient was admitted to observation.  He was given a heart score of 7 and cardiology was consulted for \"chest pain\"    Home cardiac medications include aspirin 81 daily milligrams daily, atorvastatin 40 mg daily, Coreg 25 mg twice daily, Lasix 80 mg daily, hydralazine 100 mg 3 times daily, isosorbide dinitrate 40 mg 3 times daily and Xarelto 10 mg daily(further DVT prevention; also CAD and very brief AFIB on ICD check in past).       #Atypical Chest Pain-HS 5, HS Troponin 56/40(low flat trend), EKG AV pacing, CP free now  #HX of Atrial Fibrillation -Currently paced, on Xarelto  #CAD s/p Remote CABG x2 1984, 2007 on ASA, Statin, BB  #HFrEF LVEF " "20% mod-sev MR(TTE 5/2023), looks rather euvolemic on exam, SOB while laying flat  #ICM s/p ICD -CRT (2006)  #HTN-controlled  #HLD- on Statin     RECS:  -We will obtain a transthoracic echocardiogram for structural evaluation including ejection fraction, assessment of regional wall motion abnormalities or valvular disease, and further evaluation of hemodynamics.    -device check   -c/w home meds   -one dose of Lasix 80mg IVP once    Code Status:  Full Code    I spent 40 minutes in the professional and overall care of this patient.        Jaimie Barrera, APRN-CNP       ======================================================  Attending Note   ======================================================  Both the RASHARD and I have had a face to face encounter with the patient today. I have examined the patient and edited the documented physical examination as necessary.  I personally reviewed the patient's recent labs, medications, orders, EKGs, and pertinent cardiac imaging.  I have reviewed the RASHARD's encounter note, approve the RASHARD's documentation and have edited the note to reflect the diagnostic and therapeutic plan.    84 yo BM w/ h/o CAD s/p CABG '86, s/p MI '84, ICM/HFrEF s/p ICD-CRT '06, pHTN, PAD s/p RLE stent, DVT, HTN, HLD, DM, CKD who Alex with chest pain.  Patient was admitted to observation.  He was given a heart score of 7 and cardiology was consulted for \"chest pain\"    According to the patient he developed chest pain yesterday early morning across the front of his chest.  He denies any radiation of the pain.  He claims it was a 9 out of 10 and was intermittent.  He denies any nausea, vomiting, lightheadedness, diaphoresis.  He said the pain was consistent so he decided to drive himself to the emergency room.  He said the pain went away last night to the middle of the night and has not returned.  He denies any kind of smoking, social alcohol, marijuana occasionally.    Afebrile, heart rate 79, blood pressure " 131/88, 100% on room air.  On admission labs BUNs/CR 43/1.97(b/l 1.6-2.3), sensitivity troponins 50/46(previous troponins were 26 and 25), , H&H stable 13.0/38.2, chest x-ray is nonacute, EKG shows AV pacing.  Patient was given 324 mg of aspirin and made n.p.o.    Home cardiac medications include aspirin 81 daily milligrams daily, atorvastatin 40 mg daily, Coreg 25 mg twice daily, Lasix 80 mg daily, hydralazine 100 mg 3 times daily, isosorbide dinitrate 40 mg 3 times daily and Xarelto 10 mg daily(further DVT prevention; also CAD and very brief AFIB on ICD check in past).     Past Cardiology Tests (Last 3 Years):  Echo 2/20: mod LVE, EF 20-25%, antsep/apex AK, DD, sev LAE, mod MR, PASP 36  Echo 5/23: sev LVE, EF 20%, mild RV dysfxn, sev LAE, mod-sev MR, mod TR, PASP 84  Nuc 2/20: dist ant/apex scar, no ischemia, sev LVE, EF 16%  Nuc 4/22: ant/antsep/apex scar, no ischemia, sev LVE, EF 16%  ICD check 9/22: A pace 99%, V pace 93%, AF x1 (2m), NSVT x19 (1-2s)     No exacerbating or relieving factors.  Patient denies chest pain and angina.  Pt denies shortness of breath, dyspnea on exertion, orthopnea, and paroxysmal nocturnal dyspnea.  Pt denies worsening lower extremity edema.  Pt denies palpitations or syncope.  No recent falls.  No fever or chills.  No cough.  No change in bowel or bladder habits.  No sick contacts.  No recent travel.     12 point review of systems including (Constitutional, Eyes, ENMT, Respiratory, Cardiac, Gastrointestinal, Neurological, Psychiatric, and Hematologic) was performed and is otherwise negative.    Past medical history:  As above.    Medications were reviewed.    Allergies were reviewed.    Social history:  Patient denies smoking, alcohol abuse, or illicit drug use.    Family history:  No sudden cardiac death or premature coronary artery disease.     Patient seen and examined in bed 108A.  General:  Patient is awake, alert, and oriented.  Patient is in no acute distress.  HEENT:   "Pupils equal and reactive.  Normocephalic.  Moist mucosa.    Neck:  No thyromegaly.  Elevated JVD when head of bed flat..  Cardiovascular:  Regular rate and rhythm.  Normal S1 and S2.  Pulmonary:  Clear to auscultation bilaterally.  Abdomen:  Soft. Non-tender.   Non-distended.  Positive bowel sounds.  Lower Extremities:  2+ pedal pulses. No LE edema.  Neurologic:  Cranial nerves intact.  No focal deficit.   Skin: Skin warm and dry, normal skin turgor.   Psychiatric: Normal affect.    Vital signs, telemetry, medications, labs, and imaging were reviewed as well.      84 yo BM w/ h/o CAD s/p CABG '86, s/p MI '84, ICM/HFrEF s/p ICD-CRT '06, pHTN, PAD s/p RLE stent, DVT, HTN, HLD, DM, CKD who Alex with chest pain.  Patient was admitted to observation.  He was given a heart score of 7 and cardiology was consulted for \"chest pain\"    Home cardiac medications include aspirin 81 daily milligrams daily, atorvastatin 40 mg daily, Coreg 25 mg twice daily, Lasix 80 mg daily, hydralazine 100 mg 3 times daily, isosorbide dinitrate 40 mg 3 times daily and Xarelto 10 mg daily(further DVT prevention; also CAD and very brief AFIB on ICD check in past).       #Atypical Chest Pain-HS 5, HS Troponin 56/40(low flat trend), EKG AV pacing, CP free now  #HX of Atrial Fibrillation -Currently paced, on Xarelto  #CAD s/p Remote CABG x2 1984, 2007 on ASA, Statin, BB  #HFrEF LVEF 20% mod-sev MR(TTE 5/2023), looks rather euvolemic on exam, SOB while laying flat  #ICM s/p ICD -CRT (2006)  #HTN-controlled  #HLD- on Statin     RECS:  -We will obtain a transthoracic echocardiogram for structural evaluation including ejection fraction, assessment of regional wall motion abnormalities or valvular disease, and further evaluation of hemodynamics.    -device check   -c/w home meds   -one dose of Lasix 80mg IVP once        Niraj Fuentes DO   Division of Cardiovascular Medicine  [facility] [place]        "

## 2024-05-08 NOTE — DISCHARGE SUMMARY
Discharge Diagnosis  Chest pain    Issues Requiring Follow-Up  CKD    Discharge Meds     Your medication list        CONTINUE taking these medications        Instructions Last Dose Given Next Dose Due   aspirin 81 mg EC tablet           atorvastatin 40 mg tablet  Commonly known as: Lipitor           carvedilol 25 mg tablet  Commonly known as: Coreg           Flomax 0.4 mg 24 hr capsule  Generic drug: tamsulosin           furosemide 40 mg tablet  Commonly known as: Lasix           hydrALAZINE 100 mg tablet  Commonly known as: Apresoline           isosorbide dinitrate 20 mg tablet  Commonly known as: Isordil           oxyCODONE-acetaminophen 5-325 mg tablet  Commonly known as: Percocet           SITagliptin phosphate 100 mg tablet  Commonly known as: Januvia           Xarelto 10 mg tablet  Generic drug: rivaroxaban                    Test Results Pending At Discharge  Pending Labs       No current pending labs.            Hospital Course  84-year-old with a history of CAD, CKD, DVT s/p stent placement in leg, daily use of xarelto, ICD (device check scheduled for 7/16/24), T2DM, heart disease, BPH, PVD, HF, hypertension, hyperlipidemia, incontinence, presented to [facility] ED today (5/7) with chest pain. He states the CP began around 3-4am before coming to the ED, and describes it as running along his chest. He denies any associated SOB, palpitations, abdominal pain, pain in his arms, HA, lightheadedness, and swelling in his legs. He states the pain went away this afternoon, but came back shortly after which caused him to go to the ED to get checked out. He regularly takes all his home medications. He confirms ETOH ~2x/wk, marijuana near daily, and denies illicit drug use.     In the ED, Troponins were 50 then 45, EKG paced, and heart score was 7. Pt was given ASA and nitroglycerin. CXR showed no evidence consolidating infiltrate or effusion. Lungs clear, no remarkable upper abdominal findings. Cr elevated at 1.94, prior  Cr have been above 2, but last Cr on 3/5 was 1.63    The pt was admitted to OBS for furhter work up. While in OBS, ECHO was ordered. It was unchanged from prior echo:   1. Left ventricular systolic function is severely decreased with a 15-20% estimated ejection fraction.   2. No left ventricular thrombus visualized.   3. Spectral Doppler shows an impaired relaxation pattern of left ventricular diastolic filling.   4. There is eccentric left ventricular hypertrophy.   5. There is reduced right ventricular systolic function.   6. The left atrium is severely dilated.   7. RVSP within normal limits.   8. There are multiple wall motion abnormalities.      Patient was discharged with no new medications on same home meds. Instructed to follow up with nephrology and continue to follow up with his cardiologist and PCP.      PA discussed plan and lab/testing results with Dr. Berrios        Pertinent Physical Exam At Time of Discharge  Physical Exam  Cardiovascular:      Rate and Rhythm: Normal rate and regular rhythm.      Heart sounds: No murmur heard.     No friction rub. No gallop.   Pulmonary:      Effort: Pulmonary effort is normal.      Breath sounds: Normal breath sounds. No wheezing, rhonchi or rales.   Abdominal:      General: Abdomen is flat. Bowel sounds are normal. There is no distension.      Palpations: Abdomen is soft.      Tenderness: There is no abdominal tenderness. There is no guarding.   Musculoskeletal:         General: Normal range of motion.      Right lower leg: No edema.      Left lower leg: No edema.   Skin:     General: Skin is warm and dry.   Neurological:      General: No focal deficit present.   Outpatient Follow-Up  No future appointments.      Debbie Rowan PA-C

## 2024-05-08 NOTE — PROGRESS NOTES
Transitional Care Coordination Progress Note:  Plan per Medical/Surgical team: treatment of CP with ASA, lipitor, IV lasix, nitrostat, pacer check pending, cardio consult  Status: Observation  Payor source: bruno ortiz dual  Discharge disposition: Home with daughter   Potential Barriers: trop 50, 46, renal 43/1.97  ADOD: 5/8/2024  KOSTAS Salvador RN, BSN Transitional Care Coordinator ED# [phone]      05/08/24 0944   Discharge Planning   Living Arrangements Children   Support Systems Children   Assistance Needed cardio work up   Type of Residence Private residence   Number of Stairs to Enter Residence 2   Number of Stairs Within Residence 12   Home or Post Acute Services None   Patient expects to be discharged to: Home with daughter   Does the patient need discharge transport arranged? Yes   RoundTrip coordination needed? Yes   Has discharge transport been arranged? No   Financial Resource Strain   How hard is it for you to pay for the very basics like food, housing, medical care, and heating? Not hard   Housing Stability   In the last 12 months, was there a time when you were not able to pay the mortgage or rent on time? N   In the last 12 months, how many places have you lived? 1   In the last 12 months, was there a time when you did not have a steady place to sleep or slept in a shelter (including now)? N   Transportation Needs   In the past 12 months, has lack of transportation kept you from medical appointments or from getting medications? no   In the past 12 months, has lack of transportation kept you from meetings, work, or from getting things needed for daily living? No

## 2024-05-08 NOTE — PROGRESS NOTES
"Chetan Luna is a 84 y.o. male on day 0 of admission presenting with Chest pain.    Subjective   Patient is seen laying in bed alone in room. He currently denies any CP, palpitations, SOB, HA, dizziness, abdominal pain, swelling in his legs, issues with urination, changes in bowel habits.        Objective     Physical Exam  Cardiovascular:      Rate and Rhythm: Normal rate and regular rhythm.      Heart sounds: No murmur heard.     No friction rub. No gallop.   Pulmonary:      Effort: Pulmonary effort is normal.      Breath sounds: Normal breath sounds. No wheezing, rhonchi or rales.   Abdominal:      General: Abdomen is flat. Bowel sounds are normal. There is no distension.      Palpations: Abdomen is soft.      Tenderness: There is no abdominal tenderness. There is no guarding.   Musculoskeletal:         General: Normal range of motion.      Right lower leg: No edema.      Left lower leg: No edema.   Skin:     General: Skin is warm and dry.   Neurological:      General: No focal deficit present.         Last Recorded Vitals  Blood pressure 121/82, pulse 71, temperature 36.1 °C (97 °F), temperature source Temporal, resp. rate 17, height 1.778 m (5' 10\"), weight 68 kg (150 lb), SpO2 98%.  Intake/Output last 3 Shifts:  No intake/output data recorded.    Relevant Results  Scheduled medications  aspirin, 81 mg, oral, Daily  atorvastatin, 40 mg, oral, Nightly  carvedilol, 50 mg, oral, BID  furosemide, , ,   furosemide, , ,   [Held by provider] furosemide, 40 mg, oral, TID  hydrALAZINE, 100 mg, oral, TID  insulin lispro, 0-5 Units, subcutaneous, Before meals & nightly  isosorbide dinitrate, 20 mg, oral, q8h  perflutren lipid microspheres, 0.5-10 mL of dilution, intravenous, Once in imaging  perflutren protein A microsphere, 0.5 mL, intravenous, Once in imaging  rivaroxaban, 10 mg, oral, Daily with evening meal  sulfur hexafluoride microsphr, 2 mL, intravenous, Once in imaging  tamsulosin, 0.4 mg, oral, " Daily      Continuous medications     PRN medications  PRN medications: acetaminophen **OR** acetaminophen **OR** acetaminophen, dextrose, dextrose, furosemide, furosemide, glucagon, glucagon, nitroglycerin, polyethylene glycol  ECG 12 lead    Result Date: 5/8/2024  AV dual-paced rhythm Biventricular pacemaker detected Abnormal ECG No previous ECGs available See ED provider note for full interpretation and clinical correlation Confirmed by Cynthia Jose ([id number]) on 5/8/2024 11:03:46 AM    XR chest 2 views    Result Date: 5/7/2024  STUDY: Chest Radiographs;  5/7/2024 at 2:24 PM. INDICATION: Chest pain. COMPARISON: None available. ACCESSION NUMBER(S): [id number] ORDERING CLINICIAN: IDRIS JUNE TECHNIQUE:  Frontal and lateral chest. FINDINGS: The patient status post median sternotomy with a multi lead pacemaker. CARDIOMEDIASTINAL SILHOUETTE: Cardiomediastinal silhouette is normal in size and configuration.  LUNGS: Lungs are clear.  ABDOMEN: No remarkable upper abdominal findings.  BONES: No acute osseous changes.    No evidence consolidating infiltrate or effusion. Signed by Jayden Tracy MD   Results for orders placed or performed during the hospital encounter of 05/07/24 (from the past 24 hour(s))   CBC and Auto Differential   Result Value Ref Range    WBC 6.5 4.4 - 11.3 x10*3/uL    nRBC 0.0 0.0 - 0.0 /100 WBCs    RBC 4.92 4.50 - 5.90 x10*6/uL    Hemoglobin 13.5 13.5 - 17.5 g/dL    Hematocrit 39.2 (L) 41.0 - 52.0 %    MCV 80 80 - 100 fL    MCH 27.4 26.0 - 34.0 pg    MCHC 34.4 32.0 - 36.0 g/dL    RDW 17.2 (H) 11.5 - 14.5 %    Platelets 196 150 - 450 x10*3/uL    Neutrophils % 72.8 40.0 - 80.0 %    Immature Granulocytes %, Automated 0.8 0.0 - 0.9 %    Lymphocytes % 18.3 13.0 - 44.0 %    Monocytes % 7.0 2.0 - 10.0 %    Eosinophils % 0.6 0.0 - 6.0 %    Basophils % 0.5 0.0 - 2.0 %    Neutrophils Absolute 4.70 1.60 - 5.50 x10*3/uL    Immature Granulocytes Absolute, Automated 0.05 0.00 - 0.50 x10*3/uL     Lymphocytes Absolute 1.18 0.80 - 3.00 x10*3/uL    Monocytes Absolute 0.45 0.05 - 0.80 x10*3/uL    Eosinophils Absolute 0.04 0.00 - 0.40 x10*3/uL    Basophils Absolute 0.03 0.00 - 0.10 x10*3/uL   Comprehensive metabolic panel   Result Value Ref Range    Glucose 109 (H) 74 - 99 mg/dL    Sodium 136 136 - 145 mmol/L    Potassium 3.6 3.5 - 5.3 mmol/L    Chloride 102 98 - 107 mmol/L    Bicarbonate 23 21 - 32 mmol/L    Anion Gap 15 10 - 20 mmol/L    Urea Nitrogen 37 (H) 6 - 23 mg/dL    Creatinine 1.94 (H) 0.50 - 1.30 mg/dL    eGFR 34 (L) >60 mL/min/1.73m*2    Calcium 9.0 8.6 - 10.3 mg/dL    Albumin 4.3 3.4 - 5.0 g/dL    Alkaline Phosphatase 59 33 - 136 U/L    Total Protein 7.2 6.4 - 8.2 g/dL    AST 18 9 - 39 U/L    Bilirubin, Total 0.9 0.0 - 1.2 mg/dL    ALT 17 10 - 52 U/L   Troponin I, High Sensitivity, Initial   Result Value Ref Range    Troponin I, High Sensitivity 50 (H) 0 - 20 ng/L   B-type natriuretic peptide   Result Value Ref Range     (H) 0 - 99 pg/mL   Hemoglobin A1c   Result Value Ref Range    Hemoglobin A1C 7.1 (H) see below %    Estimated Average Glucose 157 Not Established mg/dL   ECG 12 lead   Result Value Ref Range    Ventricular Rate 78 BPM    Atrial Rate 78 BPM    [place] Interval 168 ms    QRS Duration 162 ms    QT Interval 438 ms    QTC Calculation(Bazett) 499 ms    P Axis 63 degrees    R Axis 230 degrees    T Axis 25 degrees    QRS Count 13 beats    Q Onset 204 ms    P Onset 136 ms    P Offset 180 ms    T Offset 423 ms    QTC Fredericia 478 ms   Troponin, High Sensitivity, 1 Hour   Result Value Ref Range    Troponin I, High Sensitivity 46 (H) 0 - 20 ng/L   POCT GLUCOSE   Result Value Ref Range    POCT Glucose 107 (H) 74 - 99 mg/dL   POCT GLUCOSE   Result Value Ref Range    POCT Glucose 107 (H) 74 - 99 mg/dL   CBC   Result Value Ref Range    WBC 5.9 4.4 - 11.3 x10*3/uL    nRBC 0.0 0.0 - 0.0 /100 WBCs    RBC 4.74 4.50 - 5.90 x10*6/uL    Hemoglobin 13.0 (L) 13.5 - 17.5 g/dL    Hematocrit 38.2 (L) 41.0 -  52.0 %    MCV 81 80 - 100 fL    MCH 27.4 26.0 - 34.0 pg    MCHC 34.0 32.0 - 36.0 g/dL    RDW 17.1 (H) 11.5 - 14.5 %    Platelets 220 150 - 450 x10*3/uL   Basic Metabolic Panel   Result Value Ref Range    Glucose 126 (H) 74 - 99 mg/dL    Sodium 136 136 - 145 mmol/L    Potassium 4.1 3.5 - 5.3 mmol/L    Chloride 101 98 - 107 mmol/L    Bicarbonate 23 21 - 32 mmol/L    Anion Gap 16 10 - 20 mmol/L    Urea Nitrogen 43 (H) 6 - 23 mg/dL    Creatinine 1.97 (H) 0.50 - 1.30 mg/dL    eGFR 33 (L) >60 mL/min/1.73m*2    Calcium 9.1 8.6 - 10.3 mg/dL   Creatine Kinase   Result Value Ref Range    Creatine Kinase 76 0 - 325 U/L   POCT GLUCOSE   Result Value Ref Range    POCT Glucose 134 (H) 74 - 99 mg/dL   POCT GLUCOSE   Result Value Ref Range    POCT Glucose 124 (H) 74 - 99 mg/dL   Cardiac Device Check - Inpatient   Result Value Ref Range    BSA 1.83 m2   Urinalysis with Reflex Microscopic   Result Value Ref Range    Color, Urine Light-Yellow Light-Yellow, Yellow, Dark-Yellow    Appearance, Urine Clear Clear    Specific Gravity, Urine 1.010 1.005 - 1.035    pH, Urine 6.0 5.0, 5.5, 6.0, 6.5, 7.0, 7.5, 8.0    Protein, Urine NEGATIVE NEGATIVE, 10 (TRACE), 20 (TRACE) mg/dL    Glucose, Urine Normal Normal mg/dL    Blood, Urine NEGATIVE NEGATIVE    Ketones, Urine NEGATIVE NEGATIVE mg/dL    Bilirubin, Urine NEGATIVE NEGATIVE    Urobilinogen, Urine Normal Normal mg/dL    Nitrite, Urine NEGATIVE NEGATIVE    Leukocyte Esterase, Urine NEGATIVE NEGATIVE         Assessment/Plan   Principal Problem:    Chest pain    84-year-old with a history of CAD, CKD, DVT s/p stent placement in leg, daily use of xarelto, ICD (device check scheduled for 7/16/24), T2DM, heart disease, BPH, PVD, HF, hypertension, hyperlipidemia, incontinence, presented to [facility] ED yesterday (5/7) with chest pain. He stated the CP began around 3-4am before coming to the ED, and described it as running along his chest. He denied any associated SOB, palpitations, abdominal pain, pain  in his arms, HA, lightheadedness, and swelling in his legs. He states the pain went away in the afternoon, but came back shortly after which caused him to go to the ED to get checked out. He regularly takes all his home medications including xarelto.      Chest pain  -cardiology consulted, ordered IV lasix 80mg due to pt having SOB laying flat at time of cardiology assessment  -troponin 50 -> 46, elevated but flat  -CXR: No evidence consolidating infiltrate or effusion. Lungs clear, no remarkable upper abdominal findings  -EKG in ED: AV paced rhythm HR 78 and is unchanged from EKG in March under medical record #[id number]   -last ECHO was 6 mo ago with LVSF 20% EF and severely dilated left ventricular cavity size, global hypokinesis of the L ventricle with regional variations, reduced right ventricular systolic function, mitral valve regurgitation, tricuspid valve regurgitation, elevated pulmonary artery pressure  -updated ECHO pending  -did not order D-dimer due to no SOB, no leg edema, no other clinical signs of PE, and pt reports compliance with xarelto, no tachycardia or hypoxia, don't suspect PE.   -  -On tele   -adult cardiac diet     Hx DVT s/p stent placement in leg  CAD  ICD  Heart disease  PVD  HTN  HLD  -continue home medications:       ASA 81mg       Atorvastatin 40mg daily       Carvedilol 25mg - take 2 tablets 50 mg PO twice daily       Hydralazine 100mg - take 1 tab PO 3x/day       Isosorbide dinitrate 20mg - take 1 tab every 8hrs       Rivaroxaban 10mg take 1 tab daily with a full meal       Tamsulosin (flomax) 0.4mg 24hr capsule - take 1 capsule PO daily      CKD  -lasix  -Cr 1.97     DM  -A1c 7.1  -Accu-Cheks ACHS  -Humalog correctional sliding scale  -held Sitagliptin phosphate (Januvia) while inpatient      VTE prophylaxis: no pharm prophylaxis already taking xarelto, SCDs     GI prophylaxis: miralax      Dispo: discharge home pending normal ECHO    Labs/Testing reviewed    Interdisciplinary  team rounding completed with hospitalist, nurse, TCC    PA discussed plan and lab/testing results with Dr. Berrios               I spent 50 minutes in the professional and overall care of this patient.      Debbie Rowan, PA-C

## 2024-05-14 ENCOUNTER — TELEPHONE (OUTPATIENT)
Dept: PRIMARY CARE | Facility: CLINIC | Age: 84
End: 2024-05-14
Payer: COMMERCIAL

## 2024-05-14 NOTE — TELEPHONE ENCOUNTER
----- Message from Jn Larkin MD sent at 5/14/2024  9:53 AM EDT -----  Regarding: r  Your results from last time we checked your blood in April came back normal except your TSH is elevated 4.83 normally it should be below 4.  Need to check it again in the near future like around August 3 or 4 months months from now

## 2024-05-30 DIAGNOSIS — E78.5 HYPERLIPIDEMIA, UNSPECIFIED HYPERLIPIDEMIA TYPE: ICD-10-CM

## 2024-05-31 RX ORDER — ATORVASTATIN CALCIUM 40 MG/1
TABLET, FILM COATED ORAL
Qty: 90 TABLET | Refills: 3 | Status: SHIPPED | OUTPATIENT
Start: 2024-05-31

## 2024-06-03 ENCOUNTER — OFFICE VISIT (OUTPATIENT)
Dept: PRIMARY CARE | Facility: CLINIC | Age: 84
End: 2024-06-03
Payer: COMMERCIAL

## 2024-06-03 VITALS
WEIGHT: 146 LBS | TEMPERATURE: 97.6 F | DIASTOLIC BLOOD PRESSURE: 64 MMHG | OXYGEN SATURATION: 96 % | SYSTOLIC BLOOD PRESSURE: 110 MMHG | HEART RATE: 71 BPM | BODY MASS INDEX: 20.95 KG/M2

## 2024-06-03 DIAGNOSIS — M17.10 KNEE ARTHROPATHY: Primary | ICD-10-CM

## 2024-06-03 PROCEDURE — 1160F RVW MEDS BY RX/DR IN RCRD: CPT | Performed by: INTERNAL MEDICINE

## 2024-06-03 PROCEDURE — 1125F AMNT PAIN NOTED PAIN PRSNT: CPT | Performed by: INTERNAL MEDICINE

## 2024-06-03 PROCEDURE — 3074F SYST BP LT 130 MM HG: CPT | Performed by: INTERNAL MEDICINE

## 2024-06-03 PROCEDURE — 99213 OFFICE O/P EST LOW 20 MIN: CPT | Performed by: INTERNAL MEDICINE

## 2024-06-03 PROCEDURE — 1159F MED LIST DOCD IN RCRD: CPT | Performed by: INTERNAL MEDICINE

## 2024-06-03 PROCEDURE — 3078F DIAST BP <80 MM HG: CPT | Performed by: INTERNAL MEDICINE

## 2024-06-03 ASSESSMENT — PAIN SCALES - GENERAL: PAINLEVEL: 8

## 2024-06-03 ASSESSMENT — PATIENT HEALTH QUESTIONNAIRE - PHQ9
2. FEELING DOWN, DEPRESSED OR HOPELESS: NOT AT ALL
SUM OF ALL RESPONSES TO PHQ9 QUESTIONS 1 AND 2: 0
1. LITTLE INTEREST OR PLEASURE IN DOING THINGS: NOT AT ALL

## 2024-06-03 ASSESSMENT — ENCOUNTER SYMPTOMS
DEPRESSION: 1
LOSS OF SENSATION IN FEET: 0
OCCASIONAL FEELINGS OF UNSTEADINESS: 0

## 2024-06-03 NOTE — PROGRESS NOTES
Subjective   Patient ID: Chetan Luna is a 84 y.o. male who presents for Follow-up (Patient lost 4 lbs says he doesn't have any taste to anything.) and Joint Swelling (Right knee swelling x 1 week.).    HPI   84 years old male comes in to see me complaining of severe pain in his right knee.  It is swollen painful stiffness.  Range of motion diminished.  He denies any trauma falls or bumping the knee against anything.  Happened spontaneously.  Is been 1 week.  No calf pain or tenderness no swelling in the lower extremity or ankles.  Review of Systems  12 system review 12 system negative.  Right knee pain and right knee edema or swelling.  Objective   /64 (BP Location: Right arm, Patient Position: Sitting, BP Cuff Size: Adult)   Pulse 71   Temp 36.4 °C (97.6 °F) (Oral)   Wt 66.2 kg (146 lb)   SpO2 96%   BMI 20.95 kg/m²     Physical Exam  Alert oriented in no distress nonicteric sclera no jaundice.  Face symmetrical cranial nerves intact.  Neck supple no masses no lymph node no thyromegaly or jugular venous distention.  Lungs clear no rales wheezing or crackles but diminished.  Heart normal S1 and S2 regular rhythm.  Abdomen benign neurologically intact.  Right knee is swollen and painful when pressing on it range of motion diminished.  Skin intact.  Edema on both sides of the knee.  Referral to see orthopedist done and also patient was made aware of the [facility] walk-in orthopedic clinic.  Stay off your knee, use heat on and off for half hour every couple hours only when awake.  Protect your knee with the splint.  Cover it with Tylenol gel or cream ibuprofen Voltaren Ascriptin.  See the orthopedist as soon as you can  Assessment/Plan   Diagnoses and all orders for this visit:  Knee arthropathy  -     Referral to Orthopaedic Surgery; Future

## 2024-06-04 ENCOUNTER — HOSPITAL ENCOUNTER (OUTPATIENT)
Dept: RADIOLOGY | Facility: HOSPITAL | Age: 84
Discharge: HOME | End: 2024-06-04
Payer: COMMERCIAL

## 2024-06-04 ENCOUNTER — OFFICE VISIT (OUTPATIENT)
Dept: ORTHOPEDIC SURGERY | Facility: HOSPITAL | Age: 84
End: 2024-06-04
Payer: COMMERCIAL

## 2024-06-04 VITALS — HEIGHT: 71 IN | WEIGHT: 142 LBS | BODY MASS INDEX: 19.88 KG/M2

## 2024-06-04 DIAGNOSIS — G89.29 CHRONIC PAIN OF RIGHT KNEE: ICD-10-CM

## 2024-06-04 DIAGNOSIS — M17.11 ARTHRITIS OF RIGHT KNEE: Primary | ICD-10-CM

## 2024-06-04 DIAGNOSIS — M25.561 CHRONIC PAIN OF RIGHT KNEE: ICD-10-CM

## 2024-06-04 PROCEDURE — 73564 X-RAY EXAM KNEE 4 OR MORE: CPT | Mod: RIGHT SIDE | Performed by: STUDENT IN AN ORGANIZED HEALTH CARE EDUCATION/TRAINING PROGRAM

## 2024-06-04 PROCEDURE — 1159F MED LIST DOCD IN RCRD: CPT | Performed by: STUDENT IN AN ORGANIZED HEALTH CARE EDUCATION/TRAINING PROGRAM

## 2024-06-04 PROCEDURE — 99214 OFFICE O/P EST MOD 30 MIN: CPT | Performed by: STUDENT IN AN ORGANIZED HEALTH CARE EDUCATION/TRAINING PROGRAM

## 2024-06-04 PROCEDURE — 99204 OFFICE O/P NEW MOD 45 MIN: CPT | Performed by: STUDENT IN AN ORGANIZED HEALTH CARE EDUCATION/TRAINING PROGRAM

## 2024-06-04 PROCEDURE — 73564 X-RAY EXAM KNEE 4 OR MORE: CPT | Mod: RT

## 2024-06-04 ASSESSMENT — PAIN DESCRIPTION - DESCRIPTORS: DESCRIPTORS: SHARP;THROBBING

## 2024-06-04 ASSESSMENT — PAIN SCALES - GENERAL: PAINLEVEL_OUTOF10: 10 - WORST POSSIBLE PAIN

## 2024-06-04 ASSESSMENT — PAIN - FUNCTIONAL ASSESSMENT: PAIN_FUNCTIONAL_ASSESSMENT: 0-10

## 2024-06-04 NOTE — PROGRESS NOTES
REFERRAL SOURCE: No ref. provider found     CHIEF COMPLAINT: right knee pain  - orthopedic injury clinic evaluation    HISTORY OF PRESENT ILLNESS  Chetan Luna is a very pleasant 84 y.o. male with history of HLD, HTN who is here for evaluation of right knee pain.     6/4/24: He complains of diffuse right knee pain and swelling that started about a week ago without injury.  Pain is about the entire knee and achy.  He says that the pain is intense.  Denies radiation of the pain.  No numbness and tingling or pain going down the leg.  He has been using topical and oral Tylenol which helps a little bit.  He has difficulty walking and has been ambulating with a cane.  He has been using heat on the knee which has not been very helpful.  Denies previous injury to the right knee or presentation to a physician for right knee pain.    MEDS    Current Outpatient Medications:     aspirin 81 mg EC tablet, Take 1 tablet (81 mg) by mouth once daily., Disp: , Rfl:     atorvastatin (Lipitor) 40 mg tablet, take 1 tablet (40MG) by mouth once daily at bedtime, Disp: 90 tablet, Rfl: 3    atorvastatin (Lipitor) 40 mg tablet, Take 1 tablet (40 mg) by mouth once daily at bedtime., Disp: , Rfl:     carvedilol (Coreg) 25 mg tablet, take 2 tablets by mouth twice a day, Disp: 360 tablet, Rfl: 3    furosemide (Lasix) 40 mg tablet, take 3 tablets by mouth once daily, Disp: 270 tablet, Rfl: 3    furosemide (Lasix) 40 mg tablet, Take 3 tablets (120 mg) by mouth once daily., Disp: , Rfl:     hydrALAZINE (Apresoline) 100 mg tablet, Take 1 tablet (100 mg) by mouth 3 times a day., Disp: 270 tablet, Rfl: 3    hydrALAZINE (Apresoline) 100 mg tablet, Take 1 tablet (100 mg) by mouth 3 times a day., Disp: , Rfl:     isosorbide dinitrate (Isordil) 20 mg tablet, take 1 tablet by mouth every 8 hours, Disp: 270 tablet, Rfl: 3    oxyCODONE-acetaminophen (Percocet) 5-325 mg tablet, Take 1 tablet by mouth every 6 hours if needed for severe pain (7 - 10)., Disp:  , Rfl:     rivaroxaban (Xarelto) 10 mg tablet, TAKE 1 TABLET BY MOUTH ONCE DAILY WITH A FULL MEAL, Disp: 90 tablet, Rfl: 3    SITagliptin phosphate (Januvia) 100 mg tablet, Take 1 tablet (100 mg) by mouth once daily., Disp: , Rfl:     tamsulosin (Flomax) 0.4 mg 24 hr capsule, Take 1 capsule (0.4 mg) by mouth once daily., Disp: 0.9 capsule, Rfl: 3    tamsulosin (Flomax) 0.4 mg 24 hr capsule, Take 1 capsule (0.4 mg) by mouth once daily at bedtime., Disp: , Rfl:     ALLERGIES  Allergies   Allergen Reactions    Penicillin Anaphylaxis    Penicillins Anaphylaxis    Penicillin G Other     Syncope    Streptomycin Other     Syncope    Streptomycin Syncope       PAST MEDICAL HISTORY  Past Medical History:   Diagnosis Date    Encounter for screening for malignant neoplasm of prostate     Screening PSA (prostate specific antigen)    Old myocardial infarction 09/14/2017    History of myocardial infarction    Personal history of other diseases of the musculoskeletal system and connective tissue     Personal history of arthritis       PAST SURGICAL HISTORY  Past Surgical History:   Procedure Laterality Date    CARDIAC PACEMAKER PLACEMENT  02/14/2014    Pacemaker Placement    CHOLECYSTECTOMY  10/19/2016    Cholecystectomy    CORONARY ARTERY BYPASS GRAFT  10/19/2016    CABG    CT ANGIO NECK  10/22/2018    CT NECK ANGIO W AND WO IV CONTRAST 10/22/2018 [facility] CLINICAL [facility]GAC[facility]    CT HEAD ANGIO W AND WO IV CONTRAST  10/22/2018    CT HEAD ANGIO W AND WO IV CONTRAST 10/22/2018 [facility] CLINICAL LEGACY    OTHER SURGICAL HISTORY  02/07/2014    Coronary Artery Double Venous Bypass Graft    OTHER SURGICAL HISTORY  02/07/2014    Cholecystotomy       SOCIAL HISTORY   Social History     Socioeconomic History    Marital status:      Spouse name: Not on file    Number of children: Not on file    Years of education: Not on file    Highest education level: Not on file   Occupational History    Not on file   Tobacco Use    Smoking status: Some Days      Types: Cigars    Smokeless tobacco: Never   Substance and Sexual Activity    Alcohol use: Yes     Alcohol/week: 3.0 standard drinks of alcohol     Types: 3 Glasses of wine per week    Drug use: Yes     Types: Marijuana    Sexual activity: Not on file   Other Topics Concern    Not on file   Social History Narrative    ** Merged History Encounter **          Social Determinants of Health     Financial Resource Strain: Low Risk  (5/8/2024)    Overall Financial Resource Strain (CARDIA)     Difficulty of Paying Living Expenses: Not hard at all   Food Insecurity: Not on file   Transportation Needs: No Transportation Needs (5/8/2024)    PRAPARE - Transportation     Lack of Transportation (Medical): No     Lack of Transportation (Non-Medical): No   Physical Activity: Not on file   Stress: Not on file   Social Connections: Not on file   Intimate Partner Violence: Not on file   Housing Stability: Low Risk  (5/8/2024)    Housing Stability Vital Sign     Unable to Pay for Housing in the Last Year: No     Number of Places Lived in the Last Year: 1     Unstable Housing in the Last Year: No       FAMILY HISTORY  Family History   Problem Relation Name Age of Onset    Heart failure Mother      Prostate cancer Father         REVIEW OF SYSTEMS  Except for those mentioned in the history of present illness, and below, a complete review of systems is negative.     Review of Systems    VITALS  There were no vitals filed for this visit.    PHYSICAL EXAMINATION   GENERAL:  Awake, alert, and oriented, no apparent distress, pleasant, and cooperative  PSYC: Mood is euthymic, affect is congruent  EAR, NOSE, THROAT:  Normocephalic, atraumatic, moist membranes, anicteric sclera  LUNG: Nonlabored breathing  HEART: No clubbing or cyanosis  SKIN: No increased erythema, warmth, rashes, or concerning skin lesions  NEURO: Sensation is intact in the bilateral lower extremities. Strength is grossly 5 out of 5 throughout the bilateral lower  extremities, unless noted below.  GAIT: Non-antalgic  MUSCULOSKELETAL: Examination of the right knee: Range of motion is 0-80.  Large effusion.  Tender to palpation over the medial lateral joint lines as well as medial lateral patellar facets and patellar tendon.  Positive Sigrid's.  Negative Lachman, anterior posterior drawer, varus/valgus stress.    IMAGING STUDIES:   Radiographs of the right knee dated 6/4/2024 were imaging enthesophyte off the distal patella and tibial tuberosity.  Intra-articular loose body in the posterior knee region.  Suprapatellar effusion.  Medial compartment joint space narrowing with tibial spine spurring.  Vascular calcifications.     IMPRESSION  #1  Acute exacerbation of chronic right knee osteoarthritis    PLAN  The following was discussed with the patient:     Chetan Luna is a very pleasant 84 y.o. male with history of HLD, HTN who is here for evaluation of right knee pain due to acute exacerbation of chronic right knee osteoarthritis.  -The diagnosis of knee arthritis was discussed in detail. The only cure for arthritis is a knee replacement. Without curing arthritis, we can improve the pain that the patient experiences and hopefully allow them to continue to do the activities that they enjoy.  -Physical therapy: Work on hip abductor, knee extensor, core strengthening and flexibility with PT. The patient was given a referral to physical therapy today.  -Weight loss and physical activity: The benefits of weight loss and physical activity on improving pain experienced with arthritis were discussed.  -Bracing: He should purchase an over-the-counter neoprene knee sleeve.  -Medications: Continue to take Tylenol PO and use topical Tylenol over the counter.   -Injections: Injections, such as corticosteroid, viscosupplementation, and PRP can be utilized. The pros, cons, risks, benefits, pre-procedure and post-procedure protocols were discussed.  We will schedule him for a right knee  aspiration and injection.  -Follow-up for right knee aspiration/injection under ultrasound guidance.    The patient was counseled to remain active, but avoid activities that worsen symptoms. The patient was in agreement with this plan. All questions were answered to the best of my ability.    PATIENT EDUCATION:  Education was discussed at today's appointment. A learning needs assessment was performed.    Primary learner: Chetan Luna  Barriers to learning: None  Preferred language: English  Learning preferences include: Seeing and doing.  Discussed: Diagnosis and treatment plan.  Demonstrated: Understanding of material discussed.  Patient education materials given: None.  Learner response: Learner demonstrated understanding.    This note was dictated using Dragon speech recognition software and was not corrected for spelling or grammatical errors.

## 2024-06-05 ENCOUNTER — HOSPITAL ENCOUNTER (OUTPATIENT)
Dept: RADIOLOGY | Facility: EXTERNAL LOCATION | Age: 84
Discharge: HOME | End: 2024-06-05

## 2024-06-05 ENCOUNTER — APPOINTMENT (OUTPATIENT)
Dept: ORTHOPEDIC SURGERY | Facility: HOSPITAL | Age: 84
End: 2024-06-05
Payer: COMMERCIAL

## 2024-06-05 ENCOUNTER — OFFICE VISIT (OUTPATIENT)
Dept: ORTHOPEDIC SURGERY | Facility: HOSPITAL | Age: 84
End: 2024-06-05
Payer: COMMERCIAL

## 2024-06-05 VITALS — BODY MASS INDEX: 20.3 KG/M2 | HEIGHT: 71 IN | WEIGHT: 145 LBS

## 2024-06-05 DIAGNOSIS — M17.11 ARTHRITIS OF RIGHT KNEE: Primary | ICD-10-CM

## 2024-06-05 PROCEDURE — 20611 DRAIN/INJ JOINT/BURSA W/US: CPT | Mod: RT | Performed by: STUDENT IN AN ORGANIZED HEALTH CARE EDUCATION/TRAINING PROGRAM

## 2024-06-05 PROCEDURE — 2500000005 HC RX 250 GENERAL PHARMACY W/O HCPCS: Performed by: STUDENT IN AN ORGANIZED HEALTH CARE EDUCATION/TRAINING PROGRAM

## 2024-06-05 PROCEDURE — 1159F MED LIST DOCD IN RCRD: CPT | Performed by: STUDENT IN AN ORGANIZED HEALTH CARE EDUCATION/TRAINING PROGRAM

## 2024-06-05 PROCEDURE — 1125F AMNT PAIN NOTED PAIN PRSNT: CPT | Performed by: STUDENT IN AN ORGANIZED HEALTH CARE EDUCATION/TRAINING PROGRAM

## 2024-06-05 PROCEDURE — 2500000004 HC RX 250 GENERAL PHARMACY W/ HCPCS (ALT 636 FOR OP/ED): Performed by: STUDENT IN AN ORGANIZED HEALTH CARE EDUCATION/TRAINING PROGRAM

## 2024-06-05 RX ORDER — METHYLPREDNISOLONE ACETATE 40 MG/ML
40 INJECTION, SUSPENSION INTRA-ARTICULAR; INTRALESIONAL; INTRAMUSCULAR; SOFT TISSUE
Status: COMPLETED | OUTPATIENT
Start: 2024-06-05 | End: 2024-06-05

## 2024-06-05 RX ORDER — ROPIVACAINE HYDROCHLORIDE 5 MG/ML
3 INJECTION, SOLUTION EPIDURAL; INFILTRATION; PERINEURAL
Status: COMPLETED | OUTPATIENT
Start: 2024-06-05 | End: 2024-06-05

## 2024-06-05 RX ORDER — LIDOCAINE HYDROCHLORIDE 10 MG/ML
2 INJECTION, SOLUTION EPIDURAL; INFILTRATION; INTRACAUDAL; PERINEURAL
Status: COMPLETED | OUTPATIENT
Start: 2024-06-05 | End: 2024-06-05

## 2024-06-05 RX ADMIN — ROPIVACAINE HYDROCHLORIDE 3 ML: 5 INJECTION, SOLUTION EPIDURAL; INFILTRATION; PERINEURAL at 11:51

## 2024-06-05 RX ADMIN — LIDOCAINE HYDROCHLORIDE 2 ML: 10 INJECTION, SOLUTION EPIDURAL; INFILTRATION; INTRACAUDAL; PERINEURAL at 11:51

## 2024-06-05 RX ADMIN — METHYLPREDNISOLONE ACETATE 40 MG: 40 INJECTION, SUSPENSION INTRA-ARTICULAR; INTRALESIONAL; INTRAMUSCULAR; SOFT TISSUE at 11:51

## 2024-06-05 ASSESSMENT — PAIN DESCRIPTION - DESCRIPTORS: DESCRIPTORS: ACHING

## 2024-06-05 ASSESSMENT — PAIN SCALES - GENERAL: PAINLEVEL_OUTOF10: 8

## 2024-06-05 NOTE — PROGRESS NOTES
Large Jt Asp/Inj: R knee on 6/5/2024 11:51 AM  Details: ultrasound-guided  Medications: 40 mg methylPREDNISolone acetate 40 mg/mL; 2 mL lidocaine PF 10 mg/mL (1 %); 3 mL ropivacaine 5 mg/mL (0.5 %)    Pre-Procedure Diagnosis: right knee pain, right knee osteoarthritis and effusion   Post-Procedure Diagnosis: right knee pain, right knee osteoarthritis and effusion    Procedure: US-guided right knee aspiration and corticosteroid injection    History of Present Illness: Chetan Luna is a pleasant 84 y.o. male with knee pain secondary to arthritis and effusion who is here for the above procedure for improved pain control.      Medications and allergies were reviewed with the patient. No contraindications were identified.    Informed Consent:   Following denial of allergy and review of potential side effects and complications including but not necessarily limited to infection, allergic reaction, local tissue breakdown, systemic effects of corticosteroids, elevation of blood glucose, injury to soft tissue and/or nerves and seizure, the patient indicated understanding and agreed to proceed. Written consent to treatment was obtained and the patient verbalized consent for the procedure.    Procedural Details  The use of direct ultrasound visualization of the needle (rather than a non-guided injection) was required to increase patient safety by excluding inadvertent intramuscular or intratendinous placement and minimizing bleeding by avoiding osteochondral or vascular injury from the needle.  Additionally, the increased accuracy of placement may increase clinical effectiveness and will allow higher diagnostic specificity when evaluating effectiveness of this injection.    Using ultrasound, a pre-scan of the region was performed to identify the target structure.     The area was prepped with chlorhexidine, then re-examined using the same transducer, a sterile ultrasound transducer cover, and sterile ultrasound  gel.    Procedural pause conducted to verify:  correct patient identity, procedure to be performed, and as applicable, correct side and site, correct patient position, and availability of implants, special equipment, or special requirements    Procedure:   Transducer: Linear array transducer.  Patient position: Supine with the knee bent to 30 degrees.  Localization process: The suprapatellar recess was localized in a short axis view.  Local anesthesia: Local anesthesia was obtained with 3 cc of 1% lidocaine.  Needle: A 27-gauge, 1.5-inch needle was used for local anesthesia and a 18-gauge, 1.5 inch needle was used for the aspiration and injectate.  Approach: A lateral to medial, in plane, approach was used to guide the needle tip into the suprapatellar recess deep to the quadriceps tendon and superficial to the prefemoral fat pad.   Injection/Aspiration: 96 cc of cloudy synovial fluid were aspirated. Thereafter, the syringes were switched and a mixture of 3 cc of 0.5% ropivocaine and 1 cc of DepoMedrol (40mg/mL) was injected into the right knee joint without complication.    Post-procedural care: The patient tolerated the procedure well and reported complete pain relief during the anesthetic phase. The patient was asked to ice for improved pain control and avoid submerging the area in water for the next 48 hours to help reduce the risk of infection. The patient was instructed to call the office immediately if there are any questions or concerns.     PATIENT EDUCATION:  Education of the diagnosis and treatment plan was discussed at today's appointment. A learning needs assessment was performed. The patient demonstrated understanding.

## 2024-07-10 NOTE — PROGRESS NOTES
05/08/24 0943   [facility] Disability Status   Are you deaf or do you have serious difficulty hearing? N   Are you blind or do you have serious difficulty seeing, even when wearing glasses? N   Because of a physical, mental, or emotional condition, do you have serious difficulty concentrating, remembering, or making decisions? (5 years old or older) N   Do you have serious difficulty walking or climbing stairs? N   Do you have serious difficulty dressing or bathing? N   Because of a physical, mental, or emotional condition, do you have serious difficulty doing errands alone such as visiting the doctor? N        You can access the FollowMyHealth Patient Portal offered by [facility] by registering at the following website: [email or web address]. By joining [facility]’s FollowMyHealth portal, you will also be able to view your health information using other applications (apps) compatible with our system.

## 2024-07-18 ENCOUNTER — OFFICE VISIT (OUTPATIENT)
Dept: PRIMARY CARE | Facility: CLINIC | Age: 84
End: 2024-07-18
Payer: COMMERCIAL

## 2024-07-18 VITALS
WEIGHT: 141 LBS | TEMPERATURE: 97.5 F | OXYGEN SATURATION: 98 % | DIASTOLIC BLOOD PRESSURE: 88 MMHG | SYSTOLIC BLOOD PRESSURE: 150 MMHG | BODY MASS INDEX: 19.95 KG/M2 | HEART RATE: 93 BPM

## 2024-07-18 DIAGNOSIS — E11.43 DIABETIC AUTONOMIC NEUROPATHY ASSOCIATED WITH TYPE 2 DIABETES MELLITUS (MULTI): ICD-10-CM

## 2024-07-18 DIAGNOSIS — Z86.718 HISTORY OF DVT (DEEP VEIN THROMBOSIS): ICD-10-CM

## 2024-07-18 DIAGNOSIS — D64.9 ANEMIA, UNSPECIFIED TYPE: Primary | ICD-10-CM

## 2024-07-18 DIAGNOSIS — I50.43 ACUTE ON CHRONIC COMBINED SYSTOLIC AND DIASTOLIC CHF (CONGESTIVE HEART FAILURE) (MULTI): ICD-10-CM

## 2024-07-18 DIAGNOSIS — N18.30 STAGE 3 CHRONIC KIDNEY DISEASE, UNSPECIFIED WHETHER STAGE 3A OR 3B CKD (MULTI): ICD-10-CM

## 2024-07-18 DIAGNOSIS — E46 PROTEIN-CALORIE MALNUTRITION, UNSPECIFIED SEVERITY (MULTI): ICD-10-CM

## 2024-07-18 DIAGNOSIS — I10 PRIMARY HYPERTENSION: ICD-10-CM

## 2024-07-18 DIAGNOSIS — M10.061 ACUTE IDIOPATHIC GOUT OF RIGHT KNEE: ICD-10-CM

## 2024-07-18 DIAGNOSIS — I48.0 PAROXYSMAL ATRIAL FIBRILLATION (MULTI): ICD-10-CM

## 2024-07-18 LAB — URATE SERPL-MCNC: 9.2 MG/DL (ref 4–7.5)

## 2024-07-18 PROCEDURE — 80053 COMPREHEN METABOLIC PANEL: CPT | Performed by: INTERNAL MEDICINE

## 2024-07-18 PROCEDURE — 85025 COMPLETE CBC W/AUTO DIFF WBC: CPT | Performed by: INTERNAL MEDICINE

## 2024-07-18 PROCEDURE — 84550 ASSAY OF BLOOD/URIC ACID: CPT

## 2024-07-18 PROCEDURE — 3079F DIAST BP 80-89 MM HG: CPT | Performed by: INTERNAL MEDICINE

## 2024-07-18 PROCEDURE — 3077F SYST BP >= 140 MM HG: CPT | Performed by: INTERNAL MEDICINE

## 2024-07-18 PROCEDURE — 99214 OFFICE O/P EST MOD 30 MIN: CPT | Performed by: INTERNAL MEDICINE

## 2024-07-18 PROCEDURE — RXMED WILLOW AMBULATORY MEDICATION CHARGE

## 2024-07-18 PROCEDURE — 1159F MED LIST DOCD IN RCRD: CPT | Performed by: INTERNAL MEDICINE

## 2024-07-18 PROCEDURE — 1160F RVW MEDS BY RX/DR IN RCRD: CPT | Performed by: INTERNAL MEDICINE

## 2024-07-18 PROCEDURE — 36415 COLL VENOUS BLD VENIPUNCTURE: CPT

## 2024-07-18 RX ORDER — ALLOPURINOL 100 MG/1
100 TABLET ORAL DAILY
Qty: 90 TABLET | Refills: 3 | Status: SHIPPED | OUTPATIENT
Start: 2024-07-18 | End: 2025-07-18

## 2024-07-18 ASSESSMENT — PATIENT HEALTH QUESTIONNAIRE - PHQ9
1. LITTLE INTEREST OR PLEASURE IN DOING THINGS: NOT AT ALL
SUM OF ALL RESPONSES TO PHQ9 QUESTIONS 1 AND 2: 0
2. FEELING DOWN, DEPRESSED OR HOPELESS: NOT AT ALL
1. LITTLE INTEREST OR PLEASURE IN DOING THINGS: NOT AT ALL
SUM OF ALL RESPONSES TO PHQ9 QUESTIONS 1 AND 2: 0
2. FEELING DOWN, DEPRESSED OR HOPELESS: NOT AT ALL

## 2024-07-18 ASSESSMENT — ENCOUNTER SYMPTOMS
LOSS OF SENSATION IN FEET: 0
OCCASIONAL FEELINGS OF UNSTEADINESS: 0
DEPRESSION: 0

## 2024-07-18 NOTE — PROGRESS NOTES
Subjective   Patient ID: Chetan Luna is a 84 y.o. male who presents for Ear Fullness (Pt c/o not able to hear).    HPI   84 years old male comes in to see me today for a follow-up visit.  He has no specific complaint or symptoms.  He feels very well.  Just drove back from [place] on business.  Needs refill on Xarelto 10 mg a day.  Had an intervention done on his right knee for severe pain it turned to be gout.  We discussed diet and gout.  Discussed food he could eat and not.  Start him on allopurinol 100 mg at bedtime.    Medication list reviewed and reconciled.  On Lipitor 40 mg a day.  Xarelto 10 mg a day.  Flomax 0.4 mg a day.  Hydralazine 100 mg 3 times a day.  Januvia 100 mg a day Lasix 40 mg a day Coreg 25 mg twice a day isosorbide 20 mg every 8 hours.  Review of Systems  12 system review and 12 system are negative.  Objective   /88 (BP Location: Left arm, Patient Position: Sitting, BP Cuff Size: Adult)   Pulse 93   Temp 36.4 °C (97.5 °F) (Temporal)   Wt 64 kg (141 lb)   SpO2 98%   BMI 19.95 kg/m²     Physical Exam  Alert oriented in no acute distress pleasant cooperative.  Nonicteric sclera no jaundice.  Face symmetrical cranial nerves intact.  Neck supple no masses no lymph node thyromegaly or jugular venous distention.  Lungs clear no rales wheezing or crackles.  Heart normal S1 and S2 regular.  Abdomen benign neurologically intact.  No edema in the lower extremities.  No signs of failure.  Advised patient to discuss Jardiance he meets  with his cardiologist Dr. Mccarthy.  Assessment/Plan   Diagnoses and all orders for this visit:  Anemia, unspecified type  -     CBC  Primary hypertension  -     Comprehensive Metabolic Panel  Acute idiopathic gout of right knee  -     Uric Acid  -     allopurinol (Zyloprim) 100 mg tablet; Take 1 tablet (100 mg) by mouth once daily.  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

## 2024-07-22 ENCOUNTER — PHARMACY VISIT (OUTPATIENT)
Dept: PHARMACY | Facility: CLINIC | Age: 84
End: 2024-07-22
Payer: COMMERCIAL

## 2024-07-23 ENCOUNTER — APPOINTMENT (OUTPATIENT)
Dept: PRIMARY CARE | Facility: CLINIC | Age: 84
End: 2024-07-23
Payer: COMMERCIAL

## 2024-07-24 ENCOUNTER — TELEPHONE (OUTPATIENT)
Dept: PRIMARY CARE | Facility: CLINIC | Age: 84
End: 2024-07-24
Payer: COMMERCIAL

## 2024-07-24 NOTE — TELEPHONE ENCOUNTER
----- Message from Jn Lrakin sent at 7/24/2024  9:03 AM EDT -----  Regarding: r  Your lab results done at the last visit showing slight anemia not too bad, decline in your kidney function with a GFR of 36.64.  You will benefit from taking Jardiance or Farxiga , you need to visit to check your ears and to talk about that new drugs for you.  Whenever you have a minute call me or come back in the office thank you

## 2024-07-26 ENCOUNTER — APPOINTMENT (OUTPATIENT)
Dept: PRIMARY CARE | Facility: CLINIC | Age: 84
End: 2024-07-26
Payer: COMMERCIAL

## 2024-10-16 NOTE — PROGRESS NOTES
Nephrology Outpatient New Patient Visit Note    Chief Concern:  CKD    History Of Present Illness  Chetan Luna is a 84 y.o. male with a history of CKD stage 3/4, CAD s/p CABG, prior MI, ischemic cardiomyopathy, HFrEF (EF 15-20%) s/p ICD, atrial fibrillation on xarleto, DVT, DM2 for >20 years, BPH, PVD, HTN, HLD, intermittent claudication recently discharge from [facility] because of dizziness  - baseline Scr 1.8-2 since at least 2017, at [place] from hospital was 2.3, eGFR 27  [place] [facility] a few months ago w/o apparent proteinuria   Last HbA1C 7.1    On furosemide  No edema, no SOB  Only  complaints of severe LUTS, on flomax    Normal kidneys on CT from 2022      Past Medical History  He has a past medical history of Diabetes mellitus (Multi), Encounter for screening for malignant neoplasm of prostate, Old myocardial infarction (09/14/2017), and Personal history of other diseases of the musculoskeletal system and connective tissue.  Patient Active Problem List   Diagnosis    Abdominal tenderness, epigastric    Abnormal CT of the abdomen    Abnormal PSA    Abnormal weight loss    Anemia    BPH (benign prostatic hyperplasia)    Breast mass, right    CAD (coronary artery disease)    Chest pain    Cough    Diabetes mellitus (Multi)    Edema    Effusion of left olecranon bursa    Fall at home    Frequent urination    Gout    Hearing loss    Heart failure with reduced ejection fraction    Helicobacter pylori (H. pylori) infection    Hemoptysis    Hiatal hernia    History of DVT (deep vein thrombosis)    HLD (hyperlipidemia)    Hypertension    Hypokalemia    Hypothyroidism    ICD (implantable cardioverter-defibrillator) in place    Intermittent claudication (CMS-HCC)    Ischemic cardiomyopathy    Left flank pain    Right flank pain    Lumbar spinal stenosis    Nausea and vomiting    Neurogenic bladder    Neuropathy in diabetes (Multi)    Incontinence    Nocturia    Olecranon bursitis of left elbow    Past myocardial infarction     "Peripheral arterial disease (CMS-[facility])    Pneumonia    Postprandial abdominal bloating    Redness or discharge of eye    Reflux esophagitis    Renal insufficiency    S/P CABG (coronary artery bypass graft)    Sciatic nerve pain    Stomach discomfort    TIA (transient ischemic attack)    Ear pain    Tinnitus    Urgency of urination    ACC/AHA stage C heart failure with reduced ejection fraction    EDWARDS (dyspnea on exertion)    Mitral regurgitation    Chest pain    Protein-calorie malnutrition, unspecified severity (Multi)    Acute on chronic combined systolic and diastolic CHF (congestive heart failure)    Paroxysmal atrial fibrillation (Multi)    Stage 3 chronic kidney disease, unspecified whether stage 3a or 3b CKD (Multi)    Dizziness       Surgical History  He has a past surgical history that includes Other surgical history (02/07/2014); Other surgical history (02/07/2014); Cholecystectomy (10/19/2016); Coronary artery bypass graft (10/19/2016); Cardiac pacemaker placement (02/14/2014); CT angio head w and wo IV contrast (10/22/2018); and CT angio neck (10/22/2018).     Social History  He reports that he has quit smoking. His smoking use included cigars. He has never used smokeless tobacco. He reports that he does not currently use alcohol after a past usage of about 3.0 standard drinks of alcohol per week. He reports current drug use. Drug: Marijuana.    Family History  Family History   Problem Relation Name Age of Onset    Heart failure Mother      Prostate cancer Father          Allergies  Penicillin, Penicillins, Penicillin g, Streptomycin, and Streptomycin    Review of Systems  A 12-point review of systems was performed and noted be negative except for that which was mentioned in the history of present illness     Last Recorded Vitals  /76   Pulse 82   Ht 1.778 m (5' 10\")   Wt 67.1 kg (147 lb 14.9 oz)   SpO2 98%   BMI 21.23 kg/m²      Physical Exam:  Constitutional:  No acute distress  Respiration:  " Breathing comfortably, no stridor  Cardiovascular:  No clubbing/cyanosis/edema in hands  Eyes:  EOM intact, sclera normal  Neuro:  Alert and oriented times 3, Cranial nerves II-XII grossly intact and symmetric bilaterally. No asterixis  Head and Face:  Symmetric facial features, no masses or lesions, sinuses non-tender to palpation  Neck/Lymph:  No LAD, no thyroid masses, trachea midline  Skin:  skin is without visible rash  Psych:  Alert and oriented with appropriate mood and affect      Medications:  Medication Documentation Review Audit       Reviewed by Madhu Leavitt RN (Registered Nurse) on 10/10/24 at 1635      Medication Order Taking? Sig Documenting Provider Last Dose Status   allopurinol (Zyloprim) 100 mg tablet [id number]  Take 1 tablet (100 mg) by mouth once daily. Jn Larkin MD  Active   aspirin 81 mg EC tablet [id number]  Take 1 tablet (81 mg) by mouth once daily. Historical MD Nathanael  Active   atorvastatin (Lipitor) 40 mg tablet [id number]  take 1 tablet (40MG) by mouth once daily at bedtime Jn Larkin MD  Active   atorvastatin (Lipitor) 40 mg tablet [id number]  Take 1 tablet (40 mg) by mouth once daily at bedtime. Historical Provider, MD  Active   carvedilol (Coreg) 25 mg tablet [id number]  take 2 tablets by mouth twice a day Chivo Mccarthy MD  Active   furosemide (Lasix) 40 mg tablet [id number]  take 3 tablets by mouth once daily Chivo Mccarthy MD  Active   furosemide (Lasix) 40 mg tablet [id number]  Take 3 tablets (120 mg) by mouth once daily. Yogi Huffman MD  Active   hydrALAZINE (Apresoline) 100 mg tablet [id number]  Take 1 tablet (100 mg) by mouth 3 times a day. Jn Larkin MD  Active   hydrALAZINE (Apresoline) 100 mg tablet [id number]  Take 1 tablet (100 mg) by mouth 3 times a day. Yogi Huffman MD  Active   isosorbide dinitrate (Isordil) 20 mg tablet [id number]  take 1 tablet by mouth every 8 hours Jn Larkin MD  Active   oxyCODONE-acetaminophen (Percocet) 5-325 mg tablet [id number]   Take 1 tablet by mouth every 6 hours if needed for severe pain (7 - 10). Historical Provider, MD  Active   rivaroxaban (Xarelto) 10 mg tablet [id number]  TAKE 1 TABLET BY MOUTH ONCE DAILY WITH A FULL MEAL Jn Larkin MD  Active   SITagliptin phosphate (Januvia) 100 mg tablet [id number]  Take 1 tablet (100 mg) by mouth once daily. Historical Provider, MD  Active   tamsulosin (Flomax) 0.4 mg 24 hr capsule [id number]  Take 1 capsule (0.4 mg) by mouth once daily at bedtime. Historical Provider, MD  Active   tiZANidine (Zanaflex) 2 mg tablet [id number]  Take 2 tablets (4 mg) by mouth 2 times a day for 7 days. Joseph Spear, APRN-CNP  Active                    Relevant Results Recent labs reviewed in the EMR.  Lab Results   Component Value Date    HGB 12.7 (L) 10/12/2024    HGB 15.2 10/10/2024    HGB 13.0 (L) 05/08/2024    MCV 81 10/12/2024    MCV 83 10/10/2024    MCV 81 05/08/2024     10/12/2024     10/10/2024     05/08/2024       Lab Results   Component Value Date    FERRITIN 20 07/05/2019    IRON 27 (L) 07/05/2019       Lab Results   Component Value Date     (L) 10/12/2024    K 4.2 10/12/2024    CL 99 10/12/2024    BUN 49 (H) 10/12/2024    CREATININE 2.30 (H) 10/12/2024         Imaging:  I personally reviewed and this is my impression:        Assessment/Plan   1. Chronic kidney disease, unspecified CKD stage  - Referral to Nephrology    2. Chronic kidney disease, stage 4 (severe) (Multi) (Primary)  - Stable CKD for many years, not much studies available, will get basic CKD work up and based on results will order additional studies prn. CKD likely from heart disease and DM. Will need to consider adding RASi and SGLT2i  - Referral to Urology; Future  - Renal function panel; Future  - CBC and Auto Differential; Future  - Uric acid; Future  - PTH, intact; Future  - Urinalysis with Reflex Microscopic; Future  - Protein, Urine Random; Future  - Albumin-Creatinine Ratio, Urine Random; Future  -  Follow Up In Nephrology; Future    3. Benign prostatic hyperplasia with urinary obstruction  - referring to urology  - Referral to Urology; Future    4. Chronic coronary microvascular dysfunction  - needs to reestablish care with cardiology  - Follow Up In Nephrology; Future     RTO 1-2 months   Mikel Salmeron MD  Division of Nephrology and Hypertension

## 2024-10-17 ENCOUNTER — APPOINTMENT (OUTPATIENT)
Dept: NEPHROLOGY | Facility: CLINIC | Age: 84
End: 2024-10-17
Payer: COMMERCIAL

## 2024-10-17 VITALS
OXYGEN SATURATION: 98 % | HEART RATE: 82 BPM | SYSTOLIC BLOOD PRESSURE: 117 MMHG | DIASTOLIC BLOOD PRESSURE: 76 MMHG | HEIGHT: 70 IN | WEIGHT: 147.93 LBS | BODY MASS INDEX: 21.18 KG/M2

## 2024-10-17 DIAGNOSIS — N18.9 CHRONIC KIDNEY DISEASE, UNSPECIFIED CKD STAGE: ICD-10-CM

## 2024-10-17 DIAGNOSIS — N18.4 CHRONIC KIDNEY DISEASE, STAGE 4 (SEVERE) (MULTI): Primary | ICD-10-CM

## 2024-10-17 DIAGNOSIS — I25.85 CHRONIC CORONARY MICROVASCULAR DYSFUNCTION: ICD-10-CM

## 2024-10-17 DIAGNOSIS — N13.8 BENIGN PROSTATIC HYPERPLASIA WITH URINARY OBSTRUCTION: ICD-10-CM

## 2024-10-17 DIAGNOSIS — N40.1 BENIGN PROSTATIC HYPERPLASIA WITH URINARY OBSTRUCTION: ICD-10-CM
